# Patient Record
Sex: MALE | Race: WHITE
[De-identification: names, ages, dates, MRNs, and addresses within clinical notes are randomized per-mention and may not be internally consistent; named-entity substitution may affect disease eponyms.]

---

## 2017-09-12 ENCOUNTER — HOSPITAL ENCOUNTER (OUTPATIENT)
Dept: HOSPITAL 8 - OUT | Age: 67
End: 2017-09-12
Attending: INTERNAL MEDICINE
Payer: MEDICARE

## 2017-09-12 VITALS — HEIGHT: 74 IN | WEIGHT: 170.86 LBS | BODY MASS INDEX: 21.93 KG/M2

## 2017-09-12 VITALS — SYSTOLIC BLOOD PRESSURE: 136 MMHG | DIASTOLIC BLOOD PRESSURE: 68 MMHG

## 2017-09-12 DIAGNOSIS — K57.30: ICD-10-CM

## 2017-09-12 DIAGNOSIS — Z88.8: ICD-10-CM

## 2017-09-12 DIAGNOSIS — Z98.890: ICD-10-CM

## 2017-09-12 DIAGNOSIS — Z72.89: ICD-10-CM

## 2017-09-12 DIAGNOSIS — K62.1: Primary | ICD-10-CM

## 2017-09-12 DIAGNOSIS — K64.0: ICD-10-CM

## 2017-09-12 DIAGNOSIS — Z80.8: ICD-10-CM

## 2017-09-12 DIAGNOSIS — Z87.891: ICD-10-CM

## 2017-09-12 PROCEDURE — 45380 COLONOSCOPY AND BIOPSY: CPT

## 2017-09-12 PROCEDURE — 45391 COLONOSCOPY W/ENDOSCOPE US: CPT

## 2017-09-12 PROCEDURE — 88305 TISSUE EXAM BY PATHOLOGIST: CPT

## 2019-01-04 ENCOUNTER — HOSPITAL ENCOUNTER (OUTPATIENT)
Dept: RADIOLOGY | Facility: MEDICAL CENTER | Age: 69
End: 2019-01-04

## 2019-01-04 ENCOUNTER — HOSPITAL ENCOUNTER (INPATIENT)
Facility: MEDICAL CENTER | Age: 69
LOS: 7 days | DRG: 025 | End: 2019-01-11
Attending: EMERGENCY MEDICINE | Admitting: HOSPITALIST
Payer: MEDICARE

## 2019-01-04 DIAGNOSIS — C34.90 PRIMARY MALIGNANT NEOPLASM OF LUNG METASTATIC TO OTHER SITE, UNSPECIFIED LATERALITY (HCC): ICD-10-CM

## 2019-01-04 DIAGNOSIS — G93.89 BRAIN MASS: ICD-10-CM

## 2019-01-04 DIAGNOSIS — C34.82 MALIGNANT NEOPLASM OF OVERLAPPING SITES OF LEFT LUNG (HCC): ICD-10-CM

## 2019-01-04 DIAGNOSIS — C79.31 BRAIN METASTASES: ICD-10-CM

## 2019-01-04 DIAGNOSIS — R29.898 WEAKNESS OF LEFT LOWER EXTREMITY: ICD-10-CM

## 2019-01-04 PROBLEM — R53.1 LEFT-SIDED WEAKNESS: Status: ACTIVE | Noted: 2019-01-04

## 2019-01-04 PROBLEM — D64.9 ANEMIA: Status: ACTIVE | Noted: 2019-01-04

## 2019-01-04 PROBLEM — D69.6 THROMBOCYTOPENIA (HCC): Status: ACTIVE | Noted: 2019-01-04

## 2019-01-04 LAB
ALBUMIN SERPL BCP-MCNC: 3.8 G/DL (ref 3.2–4.9)
ALBUMIN/GLOB SERPL: 1.7 G/DL
ALP SERPL-CCNC: 44 U/L (ref 30–99)
ALT SERPL-CCNC: 9 U/L (ref 2–50)
ANION GAP SERPL CALC-SCNC: 10 MMOL/L (ref 0–11.9)
AST SERPL-CCNC: 22 U/L (ref 12–45)
BASOPHILS # BLD AUTO: 0.2 % (ref 0–1.8)
BASOPHILS # BLD: 0.01 K/UL (ref 0–0.12)
BILIRUB SERPL-MCNC: 0.4 MG/DL (ref 0.1–1.5)
BUN SERPL-MCNC: 20 MG/DL (ref 8–22)
CALCIUM SERPL-MCNC: 8.9 MG/DL (ref 8.5–10.5)
CHLORIDE SERPL-SCNC: 109 MMOL/L (ref 96–112)
CO2 SERPL-SCNC: 22 MMOL/L (ref 20–33)
CREAT SERPL-MCNC: 0.8 MG/DL (ref 0.5–1.4)
EOSINOPHIL # BLD AUTO: 0 K/UL (ref 0–0.51)
EOSINOPHIL NFR BLD: 0 % (ref 0–6.9)
ERYTHROCYTE [DISTWIDTH] IN BLOOD BY AUTOMATED COUNT: 44.6 FL (ref 35.9–50)
GLOBULIN SER CALC-MCNC: 2.2 G/DL (ref 1.9–3.5)
GLUCOSE SERPL-MCNC: 102 MG/DL (ref 65–99)
HCT VFR BLD AUTO: 38.2 % (ref 42–52)
HGB BLD-MCNC: 12.7 G/DL (ref 14–18)
IMM GRANULOCYTES # BLD AUTO: 0.01 K/UL (ref 0–0.11)
IMM GRANULOCYTES NFR BLD AUTO: 0.2 % (ref 0–0.9)
INR PPP: 1.03 (ref 0.87–1.13)
LYMPHOCYTES # BLD AUTO: 0.64 K/UL (ref 1–4.8)
LYMPHOCYTES NFR BLD: 12.9 % (ref 22–41)
MCH RBC QN AUTO: 33.7 PG (ref 27–33)
MCHC RBC AUTO-ENTMCNC: 33.2 G/DL (ref 33.7–35.3)
MCV RBC AUTO: 101.3 FL (ref 81.4–97.8)
MONOCYTES # BLD AUTO: 0.27 K/UL (ref 0–0.85)
MONOCYTES NFR BLD AUTO: 5.4 % (ref 0–13.4)
NEUTROPHILS # BLD AUTO: 4.04 K/UL (ref 1.82–7.42)
NEUTROPHILS NFR BLD: 81.3 % (ref 44–72)
NRBC # BLD AUTO: 0 K/UL
NRBC BLD-RTO: 0 /100 WBC
PLATELET # BLD AUTO: 161 K/UL (ref 164–446)
PMV BLD AUTO: 8.7 FL (ref 9–12.9)
POTASSIUM SERPL-SCNC: 4.5 MMOL/L (ref 3.6–5.5)
PROT SERPL-MCNC: 6 G/DL (ref 6–8.2)
PROTHROMBIN TIME: 13.6 SEC (ref 12–14.6)
RBC # BLD AUTO: 3.77 M/UL (ref 4.7–6.1)
SODIUM SERPL-SCNC: 141 MMOL/L (ref 135–145)
WBC # BLD AUTO: 5 K/UL (ref 4.8–10.8)

## 2019-01-04 PROCEDURE — A9270 NON-COVERED ITEM OR SERVICE: HCPCS | Performed by: HOSPITALIST

## 2019-01-04 PROCEDURE — 96374 THER/PROPH/DIAG INJ IV PUSH: CPT

## 2019-01-04 PROCEDURE — 99285 EMERGENCY DEPT VISIT HI MDM: CPT

## 2019-01-04 PROCEDURE — 80053 COMPREHEN METABOLIC PANEL: CPT

## 2019-01-04 PROCEDURE — 700102 HCHG RX REV CODE 250 W/ 637 OVERRIDE(OP): Performed by: HOSPITALIST

## 2019-01-04 PROCEDURE — 99223 1ST HOSP IP/OBS HIGH 75: CPT | Mod: AI | Performed by: HOSPITALIST

## 2019-01-04 PROCEDURE — 700111 HCHG RX REV CODE 636 W/ 250 OVERRIDE (IP): Performed by: HOSPITALIST

## 2019-01-04 PROCEDURE — 85610 PROTHROMBIN TIME: CPT

## 2019-01-04 PROCEDURE — 85025 COMPLETE CBC W/AUTO DIFF WBC: CPT

## 2019-01-04 PROCEDURE — 770001 HCHG ROOM/CARE - MED/SURG/GYN PRIV*

## 2019-01-04 RX ORDER — BISACODYL 10 MG
10 SUPPOSITORY, RECTAL RECTAL
Status: DISCONTINUED | OUTPATIENT
Start: 2019-01-04 | End: 2019-01-11 | Stop reason: HOSPADM

## 2019-01-04 RX ORDER — AMOXICILLIN 250 MG
2 CAPSULE ORAL 2 TIMES DAILY
Status: DISCONTINUED | OUTPATIENT
Start: 2019-01-05 | End: 2019-01-11 | Stop reason: HOSPADM

## 2019-01-04 RX ORDER — HYDROMORPHONE HYDROCHLORIDE 1 MG/ML
0.5 INJECTION, SOLUTION INTRAMUSCULAR; INTRAVENOUS; SUBCUTANEOUS
Status: DISCONTINUED | OUTPATIENT
Start: 2019-01-04 | End: 2019-01-11 | Stop reason: HOSPADM

## 2019-01-04 RX ORDER — OXYCODONE HYDROCHLORIDE 5 MG/1
5 TABLET ORAL
Status: DISCONTINUED | OUTPATIENT
Start: 2019-01-04 | End: 2019-01-08 | Stop reason: ALTCHOICE

## 2019-01-04 RX ORDER — LEVOTHYROXINE SODIUM 0.1 MG/1
100 TABLET ORAL DAILY
Status: DISCONTINUED | OUTPATIENT
Start: 2019-01-05 | End: 2019-01-11 | Stop reason: HOSPADM

## 2019-01-04 RX ORDER — ONDANSETRON 4 MG/1
4 TABLET, ORALLY DISINTEGRATING ORAL EVERY 4 HOURS PRN
Status: DISCONTINUED | OUTPATIENT
Start: 2019-01-04 | End: 2019-01-11 | Stop reason: HOSPADM

## 2019-01-04 RX ORDER — SODIUM CHLORIDE 9 MG/ML
INJECTION, SOLUTION INTRAVENOUS CONTINUOUS
Status: DISCONTINUED | OUTPATIENT
Start: 2019-01-05 | End: 2019-01-05

## 2019-01-04 RX ORDER — ONDANSETRON 2 MG/ML
4 INJECTION INTRAMUSCULAR; INTRAVENOUS EVERY 4 HOURS PRN
Status: DISCONTINUED | OUTPATIENT
Start: 2019-01-04 | End: 2019-01-11 | Stop reason: HOSPADM

## 2019-01-04 RX ORDER — OXYCODONE HYDROCHLORIDE AND ACETAMINOPHEN 5; 325 MG/1; MG/1
1 TABLET ORAL EVERY 4 HOURS PRN
Status: ON HOLD | COMMUNITY
End: 2019-01-11

## 2019-01-04 RX ORDER — LEVOTHYROXINE SODIUM 100 UG/1
100 CAPSULE ORAL
Status: ON HOLD | COMMUNITY
End: 2019-01-05

## 2019-01-04 RX ORDER — ACETAMINOPHEN 325 MG/1
650 TABLET ORAL ONCE
Status: COMPLETED | OUTPATIENT
Start: 2019-01-05 | End: 2019-01-04

## 2019-01-04 RX ORDER — OXYCODONE HYDROCHLORIDE 10 MG/1
10 TABLET ORAL
Status: DISCONTINUED | OUTPATIENT
Start: 2019-01-04 | End: 2019-01-11 | Stop reason: HOSPADM

## 2019-01-04 RX ORDER — PREDNISONE 10 MG/1
10 TABLET ORAL DAILY
COMMUNITY
End: 2021-01-19

## 2019-01-04 RX ORDER — DEXAMETHASONE SODIUM PHOSPHATE 4 MG/ML
10 INJECTION, SOLUTION INTRA-ARTICULAR; INTRALESIONAL; INTRAMUSCULAR; INTRAVENOUS; SOFT TISSUE EVERY 6 HOURS
Status: DISCONTINUED | OUTPATIENT
Start: 2019-01-05 | End: 2019-01-10

## 2019-01-04 RX ORDER — LORAZEPAM 1 MG/1
TABLET ORAL
Status: ON HOLD | COMMUNITY
Start: 2018-02-26 | End: 2019-01-05

## 2019-01-04 RX ORDER — POLYETHYLENE GLYCOL 3350 17 G/17G
1 POWDER, FOR SOLUTION ORAL
Status: DISCONTINUED | OUTPATIENT
Start: 2019-01-04 | End: 2019-01-11 | Stop reason: HOSPADM

## 2019-01-04 RX ORDER — LANOLIN ALCOHOL/MO/W.PET/CERES
1000 CREAM (GRAM) TOPICAL DAILY
COMMUNITY
End: 2023-09-12

## 2019-01-04 RX ORDER — DEXAMETHASONE SODIUM PHOSPHATE 4 MG/ML
10 INJECTION, SOLUTION INTRA-ARTICULAR; INTRALESIONAL; INTRAMUSCULAR; INTRAVENOUS; SOFT TISSUE EVERY 6 HOURS
Status: DISCONTINUED | OUTPATIENT
Start: 2019-01-04 | End: 2019-01-04

## 2019-01-04 RX ADMIN — DEXAMETHASONE SODIUM PHOSPHATE 10 MG: 4 INJECTION, SOLUTION INTRA-ARTICULAR; INTRALESIONAL; INTRAMUSCULAR; INTRAVENOUS; SOFT TISSUE at 23:52

## 2019-01-04 RX ADMIN — ACETAMINOPHEN 650 MG: 325 TABLET, FILM COATED ORAL at 23:50

## 2019-01-04 ASSESSMENT — LIFESTYLE VARIABLES
TOTAL SCORE: 0
TOTAL SCORE: 0
HAVE PEOPLE ANNOYED YOU BY CRITICIZING YOUR DRINKING: NO
ON A TYPICAL DAY WHEN YOU DRINK ALCOHOL HOW MANY DRINKS DO YOU HAVE: 2
HOW MANY TIMES IN THE PAST YEAR HAVE YOU HAD 5 OR MORE DRINKS IN A DAY: 0
DO YOU DRINK ALCOHOL: YES
AVERAGE NUMBER OF DAYS PER WEEK YOU HAVE A DRINK CONTAINING ALCOHOL: 1
CONSUMPTION TOTAL: NEGATIVE
EVER FELT BAD OR GUILTY ABOUT YOUR DRINKING: NO
TOTAL SCORE: 0
HAVE YOU EVER FELT YOU SHOULD CUT DOWN ON YOUR DRINKING: NO
EVER HAD A DRINK FIRST THING IN THE MORNING TO STEADY YOUR NERVES TO GET RID OF A HANGOVER: NO

## 2019-01-04 ASSESSMENT — PAIN SCALES - GENERAL: PAINLEVEL_OUTOF10: 2

## 2019-01-05 PROBLEM — E03.9 HYPOTHYROIDISM: Status: ACTIVE | Noted: 2019-01-05

## 2019-01-05 LAB — GLUCOSE BLD-MCNC: 138 MG/DL (ref 65–99)

## 2019-01-05 PROCEDURE — 770001 HCHG ROOM/CARE - MED/SURG/GYN PRIV*

## 2019-01-05 PROCEDURE — 90670 PCV13 VACCINE IM: CPT | Performed by: HOSPITALIST

## 2019-01-05 PROCEDURE — 90471 IMMUNIZATION ADMIN: CPT

## 2019-01-05 PROCEDURE — 99232 SBSQ HOSP IP/OBS MODERATE 35: CPT | Performed by: INTERNAL MEDICINE

## 2019-01-05 PROCEDURE — 3E0234Z INTRODUCTION OF SERUM, TOXOID AND VACCINE INTO MUSCLE, PERCUTANEOUS APPROACH: ICD-10-PCS | Performed by: HOSPITALIST

## 2019-01-05 PROCEDURE — 90662 IIV NO PRSV INCREASED AG IM: CPT | Performed by: HOSPITALIST

## 2019-01-05 PROCEDURE — 82962 GLUCOSE BLOOD TEST: CPT

## 2019-01-05 PROCEDURE — 700105 HCHG RX REV CODE 258: Performed by: HOSPITALIST

## 2019-01-05 PROCEDURE — 700102 HCHG RX REV CODE 250 W/ 637 OVERRIDE(OP): Performed by: HOSPITALIST

## 2019-01-05 PROCEDURE — A9270 NON-COVERED ITEM OR SERVICE: HCPCS | Performed by: HOSPITALIST

## 2019-01-05 PROCEDURE — 3E02340 INTRODUCTION OF INFLUENZA VACCINE INTO MUSCLE, PERCUTANEOUS APPROACH: ICD-10-PCS | Performed by: HOSPITALIST

## 2019-01-05 PROCEDURE — 700111 HCHG RX REV CODE 636 W/ 250 OVERRIDE (IP): Performed by: HOSPITALIST

## 2019-01-05 RX ORDER — LEVOTHYROXINE SODIUM 0.1 MG/1
100 TABLET ORAL DAILY
COMMUNITY
End: 2021-01-19

## 2019-01-05 RX ADMIN — PNEUMOCOCCAL 13-VALENT CONJUGATE VACCINE 0.5 ML: 2.2; 2.2; 2.2; 2.2; 2.2; 4.4; 2.2; 2.2; 2.2; 2.2; 2.2; 2.2; 2.2 INJECTION, SUSPENSION INTRAMUSCULAR at 04:59

## 2019-01-05 RX ADMIN — HYDROMORPHONE HYDROCHLORIDE 0.5 MG: 1 INJECTION, SOLUTION INTRAMUSCULAR; INTRAVENOUS; SUBCUTANEOUS at 02:49

## 2019-01-05 RX ADMIN — DEXAMETHASONE SODIUM PHOSPHATE 10 MG: 4 INJECTION, SOLUTION INTRA-ARTICULAR; INTRALESIONAL; INTRAMUSCULAR; INTRAVENOUS; SOFT TISSUE at 23:53

## 2019-01-05 RX ADMIN — OXYCODONE HYDROCHLORIDE 5 MG: 5 TABLET ORAL at 12:51

## 2019-01-05 RX ADMIN — DEXAMETHASONE SODIUM PHOSPHATE 10 MG: 4 INJECTION, SOLUTION INTRA-ARTICULAR; INTRALESIONAL; INTRAMUSCULAR; INTRAVENOUS; SOFT TISSUE at 05:01

## 2019-01-05 RX ADMIN — HYDROMORPHONE HYDROCHLORIDE 0.5 MG: 1 INJECTION, SOLUTION INTRAMUSCULAR; INTRAVENOUS; SUBCUTANEOUS at 06:06

## 2019-01-05 RX ADMIN — OXYCODONE HYDROCHLORIDE 10 MG: 10 TABLET ORAL at 21:57

## 2019-01-05 RX ADMIN — DEXAMETHASONE SODIUM PHOSPHATE 10 MG: 4 INJECTION, SOLUTION INTRA-ARTICULAR; INTRALESIONAL; INTRAMUSCULAR; INTRAVENOUS; SOFT TISSUE at 17:23

## 2019-01-05 RX ADMIN — INFLUENZA A VIRUS A/MICHIGAN/45/2015 X-275 (H1N1) ANTIGEN (FORMALDEHYDE INACTIVATED), INFLUENZA A VIRUS A/SINGAPORE/INFIMH-16-0019/2016 IVR-186 (H3N2) ANTIGEN (FORMALDEHYDE INACTIVATED), AND INFLUENZA B VIRUS B/MARYLAND/15/2016 BX-69A (A B/COLORADO/6/2017-LIKE VIRUS) ANTIGEN (FORMALDEHYDE INACTIVATED) 0.5 ML: 60; 60; 60 INJECTION, SUSPENSION INTRAMUSCULAR at 04:57

## 2019-01-05 RX ADMIN — DEXAMETHASONE SODIUM PHOSPHATE 10 MG: 4 INJECTION, SOLUTION INTRA-ARTICULAR; INTRALESIONAL; INTRAMUSCULAR; INTRAVENOUS; SOFT TISSUE at 12:51

## 2019-01-05 RX ADMIN — SODIUM CHLORIDE: 9 INJECTION, SOLUTION INTRAVENOUS at 02:33

## 2019-01-05 RX ADMIN — OXYCODONE HYDROCHLORIDE 5 MG: 5 TABLET ORAL at 16:52

## 2019-01-05 ASSESSMENT — COGNITIVE AND FUNCTIONAL STATUS - GENERAL
WALKING IN HOSPITAL ROOM: A LITTLE
TURNING FROM BACK TO SIDE WHILE IN FLAT BAD: A LITTLE
TOILETING: A LITTLE
PERSONAL GROOMING: A LITTLE
DRESSING REGULAR UPPER BODY CLOTHING: A LITTLE
DAILY ACTIVITIY SCORE: 18
MOVING FROM LYING ON BACK TO SITTING ON SIDE OF FLAT BED: A LITTLE
MOVING TO AND FROM BED TO CHAIR: A LITTLE
SUGGESTED CMS G CODE MODIFIER DAILY ACTIVITY: CK
EATING MEALS: A LITTLE
HELP NEEDED FOR BATHING: A LITTLE
DRESSING REGULAR LOWER BODY CLOTHING: A LITTLE
CLIMB 3 TO 5 STEPS WITH RAILING: A LITTLE
SUGGESTED CMS G CODE MODIFIER MOBILITY: CK
MOBILITY SCORE: 18
STANDING UP FROM CHAIR USING ARMS: A LITTLE

## 2019-01-05 ASSESSMENT — ENCOUNTER SYMPTOMS
NAUSEA: 0
HEMOPTYSIS: 0
BLURRED VISION: 0
TINGLING: 0
FOCAL WEAKNESS: 1
VOMITING: 0
DIZZINESS: 0
DIARRHEA: 0
SHORTNESS OF BREATH: 0
EYE DISCHARGE: 0
COUGH: 0
PALPITATIONS: 0
CONSTIPATION: 0
WEAKNESS: 0
SPEECH CHANGE: 0
SENSORY CHANGE: 0
HALLUCINATIONS: 0
HEARTBURN: 0
HEADACHES: 0
ABDOMINAL PAIN: 0
CHILLS: 0
WEAKNESS: 1
FLANK PAIN: 0
SHORTNESS OF BREATH: 1
TREMORS: 0
MYALGIAS: 0
DOUBLE VISION: 0
BRUISES/BLEEDS EASILY: 0
DEPRESSION: 0
FEVER: 0
SENSORY CHANGE: 1

## 2019-01-05 ASSESSMENT — PAIN SCALES - GENERAL
PAINLEVEL_OUTOF10: 5
PAINLEVEL_OUTOF10: 3
PAINLEVEL_OUTOF10: 4
PAINLEVEL_OUTOF10: 2
PAINLEVEL_OUTOF10: 2
PAINLEVEL_OUTOF10: 4
PAINLEVEL_OUTOF10: 7
PAINLEVEL_OUTOF10: 2
PAINLEVEL_OUTOF10: 2

## 2019-01-05 ASSESSMENT — PATIENT HEALTH QUESTIONNAIRE - PHQ9
2. FEELING DOWN, DEPRESSED, IRRITABLE, OR HOPELESS: NOT AT ALL
1. LITTLE INTEREST OR PLEASURE IN DOING THINGS: NOT AT ALL
SUM OF ALL RESPONSES TO PHQ9 QUESTIONS 1 AND 2: 0

## 2019-01-05 ASSESSMENT — LIFESTYLE VARIABLES
SUBSTANCE_ABUSE: 0
EVER_SMOKED: YES

## 2019-01-05 NOTE — PROGRESS NOTES
Report received from Home in ER.  Assessment complete.  A&O x 4. Patient calls appropriately.  Patient in bed with standby assist. Bed alarm n/a.   Patient has 6/10 pain. Medicated per MAR.  Denies N&V. Tolerating NPO strict diet.  + void, + flatus, + BM.  Patient denies SOB.  SCD's off.  Patient has headache still and numbness on the L side.   Review plan with of care with patient. Call light and personal belongings with in reach. Hourly rounding in place. All needs met at this time.

## 2019-01-05 NOTE — ASSESSMENT & PLAN NOTE
Continue with neuro checks   This is due to brain metastasis and vasogenic edema  No significant improvement with decadron.

## 2019-01-05 NOTE — ED NOTES
Patient complaining of persistent headache. Dr. Will notified and tylenol PO ordered. Patient able to swallow well without coughing and manages his secretions well. NAD. VSS.

## 2019-01-05 NOTE — PROGRESS NOTES
Neurosurgery    Patient seen and examined.  Consult done.  Has a right frontal met.   I think that he would be an appropriate surgical candidate for resection and post op radiation.

## 2019-01-05 NOTE — ED PROVIDER NOTES
ED Provider Note    HPI: Patient is a 68-year-old male who presented to the emergency department by ambulance transfer January 4, 2019 at 10:03 PM with a chief complaint of a headache.    Patient is had intermittent headaches over the last couple of weeks.  He apparently has had some trouble with left upper and left lower extremity weakness more in the leg.  He also has had intermittent slurred speech.  He has had no nausea or vomiting.  No fever no chills no cough.  No chest pain no shortness of breath no abdominal pain.  No head trauma.  No visual complaints.  No other somatic complaints    Review of Systems: As for intermittent headaches left upper and left lower extremity weakness primarily in the leg intermittent slurred speech negative for nausea vomiting fever chills cough chest pain shortness of breath abdominal pain head trauma visual complaints.  Review of systems reviewed with patient, all other systems    Past medical/surgical history: Stage III lung cancer status post radiation and chemotherapy chronic back pain pneumonia psoriasis    Medications: Percocet levothyroxine Ativan prednisone    Allergies: Demerol    Social History: Previous history of smoking social use of alcohol      Physical exam: Constitutional: Pleasant male awake alert  Vital signs: Temperature 98.1 blood pressure 133/72 pulse 62 respirations 16 pulse oximetry 97  EYES: PERRL, EOMI, Conjunctivae and sclera normal, eyelids normal bilaterally.  Neck: Trachea midline. No cervical masses seen or palpated. Normal range of motion, supple. No meningeal signs elicited.  Cardiac: Regular rate and rhythm. S1-S2 present. No S3 or S4 present. No murmurs, rubs, or gallops heard. No edema or varicosities were seen.   Lungs: Clear to auscultation with good aeration throughout. No wheezes, rales, or rhonchi heard. Patient's chest wall moved symmetrically with each respiratory effort. Patient was not making use of accessory muscles of respiration in  breathing.  Abdomen: Soft nontender to palpation. No rebound or guarding elicited. No organomegaly identified. No pulsatile abdominal masses identified.   Musculoskeletal:  no  pain with palpitation or movement of muscle, bone or joint , no obvious musculoskeletal deformities identified.  Neurologic: alert and awake answers questions appropriately. Moves all four extremities independently, no gross focal abnormalities identified.  Patient appears to have slightly diminished strength in his left upper and left lower extremity.  His speech is fluent but occasionally he has a little bit of difficulty with word finding  Skin: no rash or lesion seen, no palpable dermatologic lesions identified.  Psychiatric: not anxious, delusional, or hallucinating.    Medical decision making: I reviewed the studies from the other facility.  MRI imaging appears to show a right superior mid frontal lobe enhancing mass concerning for metastatic disease with associated vasogenic edema and possible right to left subfalcine herniation    Laboratory studies obtained (please see lab sheet for all results) significant findings included unremarkable CBC and coagulation studies.    Dr. No consulted for neurosurgery.  He reviewed the studies and did not feel any emergent intervention indicated this evening.  At his request the patient be admitted by the hospitalist service and Decadron will be continued at 10 mg IV every 6 hours.  Patient appears to be stable at this time.  Further care and hospital course per attending physician summary    Impression brain mass likely metastatic lung cancer  2.)  Left lower extremity weakness

## 2019-01-05 NOTE — CONSULTS
DATE OF SERVICE:  01/05/2019    CURRENT PRIMARY CARE PROVIDER:  Dr. Yoon.    REFERRING PHYSICIAN:  Amrik Will MD    CHIEF COMPLAINT:  Left-sided weakness.    HISTORY OF PRESENT ILLNESS:  A 68-year-old gentleman who was diagnosed earlier   this year with lung cancer.  He had radiation and chemotherapy for this at   [facility].  He just finished up immunotherapy.  Over the last 2-1/2 weeks, he   has had left-sided weakness, which has been somewhat progressive.  His wife   decided to bring him in after this got to the point where he was at risk of   falling.  MRI examination of the brain showed new intraaxial enhancing mass at   the gray-white junction anterior superior mid frontal lobe measuring   approximately 2.5x1.5x2 cm.  This is consistent with metastatic disease.  He   has significant amount of vasogenic edema with midline shift.    PAST MEDICAL HISTORY:  1.  Lung cancer.  2.  Hypothyroidism.    PAST SURGICAL HISTORY:  He has a surgical history of lung biopsy.  He has had   some orthopedic procedures.    REVIEW OF SYSTEMS:  He has had no chest pain or chest pressure.  He has had   some shortness of breath.  He has had no recent cough or hemoptysis.    ALLERGIES:  DEMEROL.    MEDICATIONS:  Include vitamin B12, Ativan, levothyroxine, oxycodone, and   prednisone, which has recently been administered.    PHYSICAL EXAMINATION:  NEUROLOGIC:  Alert and oriented x4.  Cranial nerves II-XII are intact.  Speech   is fluent.  He has left upper extremity weakness with shift at 4/5 affecting   the hand greater than the arm and left lower extremity strength of 4+/5.    Sensory examination appears intact.    IMPRESSION:  Right frontal lesion, which is very superficial.  This is causing   a significant amount of edema and is consistent with a lung metastasis.  I   think the best way to treat this would be a cranial resection.  Certainly one   could consider radiation alone, but I think he would do best if this was    resected followed by radiation therapy.    He appears to be doing well from his lung cancer treatment.  Dr. Pérez is his   oncologist.    I have discussed this with him as far as my suggestion and he would like to   proceed with surgical intervention.  I will meet with his wife and his sister   later on this morning.       ____________________________________     MD PJ CORTEZ / ATTILA    DD:  01/05/2019 08:08:31  DT:  01/05/2019 09:08:17    D#:  [id number]  Job#:  [id number]    cc: NATALIE Fischer MD

## 2019-01-05 NOTE — PROGRESS NOTES
2 RN Skin check for admission:    Dry/flaky BLE and BUE.  Generalized psoriasis.    All other bony prominences are intact.

## 2019-01-05 NOTE — H&P
Hospital Medicine History & Physical Note    Date of Service  1/4/2019    Primary Care Physician  Karrie Gonzalez M.D.    Consultants  NSG    Code Status  DNR/DNI    Chief Complaint  Left sided weakness    History of Presenting Illness  68 y.o. male who presented 1/4/2019 with left-sided weakness.  This patient was getting outpatient imaging today of his brain and his neck and the radiologist called the patient to go to the emergency room.  There is a new intra-axial enhancing mass in the anterior superior mid frontal lobe on the right consistent with metastatic disease with associated vasogenic edema as well as some right to left herniation.  This patient has had symptoms for about 3 weeks he has had left upper and lower extremity weakness feels like his leg is dragging behind him.  He is also had some slight slurred speech.  He has a history of lung cancer but was told that he is in remission and is on oral medication right now with Dr. Pérez from oncology.  He otherwise has no known alleviating or exacerbating factors to his symptoms.  Upon review of outside hospital records patient had a chest x-ray no new consolidation to suggest infection linear areas of consolidation left lung do not appear significantly changed consistent with treatment related changes.  MRI of the cervical spine shows redemonstration of reversal of the normal cervical lordosis associated with stable to degenerative disc disease collapse and endplate bone spurring and facet arthrosis at C5-C6 and C6-C7 resulting in mild canal narrowing without cord impingement at C5 through C6 and osseous foraminal narrowing moderate to severe left to right at C5-C6 moderate to severe right and mild to moderate left C6 through C7 unchanged.  MRI of the brain is interpreted by radiology shows new intra-axial enhancing mass at the gray white junction within the anterior superior mid frontal lobe of the right measuring 2.5 x 1.5 x 2 cm consistent with  metastatic disease associated with extensive vasogenic edema effacing the underlying right lateral ventricle resulting in 9-10 mm of right to left sub-alkaline herniation      Review of Systems  Review of Systems   Constitutional: Negative for chills and fever.   HENT: Negative for congestion, hearing loss and tinnitus.    Eyes: Negative for blurred vision, double vision and discharge.   Respiratory: Positive for shortness of breath. Negative for cough and hemoptysis.    Cardiovascular: Negative for chest pain, palpitations and leg swelling.   Gastrointestinal: Negative for abdominal pain, heartburn, nausea and vomiting.   Genitourinary: Negative for dysuria and flank pain.   Musculoskeletal: Negative for joint pain and myalgias.   Skin: Negative for rash.   Neurological: Positive for sensory change and focal weakness. Negative for dizziness, speech change and weakness.   Endo/Heme/Allergies: Negative for environmental allergies. Does not bruise/bleed easily.   Psychiatric/Behavioral: Negative for depression, hallucinations and substance abuse.       Past Medical History   has a past medical history of Cancer (HCC) and Hypothyroid.    Surgical History   has a past surgical history that includes other orthopedic surgery and lung needle biopsy.     Family History  Reviewed and noncontributory    Social History   reports that he quit smoking about 5 years ago. He has quit using smokeless tobacco. He reports that he drinks alcohol. He reports that he does not use drugs.    Allergies  Allergies   Allergen Reactions   • Demerol        Medications  Prior to Admission Medications   Prescriptions Last Dose Informant Patient Reported? Taking?   Cyanocobalamin (VITAMIN B-12) 1000 MCG Tab   Yes No   Sig: Take 1,000 mcg by mouth.   LORazepam (ATIVAN) 1 MG Tab   Yes Yes   Levothyroxine Sodium 100 MCG Cap   Yes No   Sig: Take 100 mcg by mouth.   MAGNESIUM PO   Yes No   Sig: Take  by mouth.   oxyCODONE-acetaminophen (PERCOCET)  5-325 MG Tab   Yes Yes   Sig: Take 1-2 Tabs by mouth every four hours as needed.   predniSONE (DELTASONE) 10 MG Tab   Yes No   Sig: Take 10 mg by mouth.      Facility-Administered Medications: None       Physical Exam  Temp:  [36.7 °C (98.1 °F)] 36.7 °C (98.1 °F)  Pulse:  [62-63] 63  Resp:  [16-17] 17  BP: (133)/(72) 133/72    Physical Exam   Constitutional: He appears well-developed and well-nourished.   HENT:   Head: Normocephalic and atraumatic.   Eyes:   Unequal pupil   Neck: Normal range of motion. Neck supple. No JVD present.   Cardiovascular: Normal rate, regular rhythm, normal heart sounds and intact distal pulses.    No murmur heard.  Pulmonary/Chest: Effort normal and breath sounds normal. No respiratory distress. He exhibits no tenderness.   Abdominal: Soft. Bowel sounds are normal. He exhibits no distension. There is no tenderness.   Musculoskeletal: Normal range of motion. He exhibits no edema.   Neurological: He exhibits normal muscle tone.   Left sided weakness, RUE/LUE 4/5   Skin: Skin is warm and dry. No erythema.   Psychiatric:   depressed   Nursing note and vitals reviewed.      Laboratory:  Recent Labs      01/04/19   2210   WBC  5.0   RBC  3.77*   HEMOGLOBIN  12.7*   HEMATOCRIT  38.2*   MCV  101.3*   MCH  33.7*   MCHC  33.2*   RDW  44.6   PLATELETCT  161*   MPV  8.7*         No results for input(s): ALTSGPT, ASTSGOT, ALKPHOSPHAT, TBILIRUBIN, DBILIRUBIN, GAMMAGT, AMYLASE, LIPASE, ALB, PREALBUMIN, GLUCOSE in the last 72 hours.  Recent Labs      01/04/19   2210   INR  1.03             No results for input(s): TROPONINI in the last 72 hours.    Urinalysis:    No results found     Imaging:  OUTSIDE IMAGES-MR BRAIN   Final Result      OUTSIDE IMAGES-MR BRAIN   Final Result      OUTSIDE IMAGES-MR CERVICAL SPINE   Final Result      OUTSIDE IMAGES-DX CHEST   Final Result        Upon review of outside hospital records patient had a chest x-ray no new consolidation to suggest infection linear areas of  consolidation left lung do not appear significantly changed consistent with treatment related changes.  MRI of the cervical spine shows redemonstration of reversal of the normal cervical lordosis associated with stable to degenerative disc disease collapse and endplate bone spurring and facet arthrosis at C5-C6 and C6-C7 resulting in mild canal narrowing without cord impingement at C5 through C6 and osseous foraminal narrowing moderate to severe left to right at C5-C6 moderate to severe right and mild to moderate left C6 through C7 unchanged.  MRI of the brain is interpreted by radiology shows new intra-axial enhancing mass at the gray white junction within the anterior superior mid frontal lobe of the right measuring 2.5 x 1.5 x 2 cm consistent with metastatic disease associated with extensive vasogenic edema effacing the underlying right lateral ventricle resulting in 9-10 mm of right to left sub-alkaline herniation    Assessment/Plan:  I anticipate this patient will require at least two midnights for appropriate medical management, necessitating inpatient admission.    * Brain metastases (HCC)   Assessment & Plan    Known lung cancer on chemo with Dr. Pérez, consult in am   Now with suspect metastatic disease to Brain, evidence of vasogenic edema and shift  Continue with IV decadron   Dysphagia screen  Neuro checks   NPO after midngith  NSG consulted     Hypothyroidism   Assessment & Plan    Continue with levothyroxine   Recheck tsh     Left-sided weakness   Assessment & Plan    Continue with neuro checks   This is due to brain metastasis and vasogenic edema  Assess resposne to IV decadron     Anemia   Assessment & Plan    Mild, no evidence of bleeding  Cont to monitor      Thrombocytopenia (HCC)   Assessment & Plan    Mild, cont to monitor      Lung cancer (HCC)   Assessment & Plan    Currently on chemotherapy   Follows with Dr. Pérez          VTE prophylaxis: scd

## 2019-01-05 NOTE — PROGRESS NOTES
Med rec complete per pt at bedside with family   Allergies reviewed  No oral ABX within last 30 days

## 2019-01-05 NOTE — ASSESSMENT & PLAN NOTE
Currently on chemotherapy   Follows with Dr. Pérez as outpatient.  Patient and family states that they would not accept any further chemotherapy

## 2019-01-05 NOTE — ASSESSMENT & PLAN NOTE
Known lung cancer on chemo with Dr. Pérez  With metastatic disease to Brain, evidence of vasogenic edema and shift  S/p resection; path results positive for metastatic lung adenocarcinoma  Continue with IV decadron; defer tapering to NSg recs   Dysphagia screen  Neuro checks   Dr. Pérez aware; possibly brain mets amenable to radiation?; defer to Onc mgmt

## 2019-01-05 NOTE — CARE PLAN
Problem: Communication  Goal: The ability to communicate needs accurately and effectively will improve  Outcome: PROGRESSING AS EXPECTED  Patient communicating effectively with staff about pain, questions, and other concerns with treatment. All questions answered.     Problem: Safety  Goal: Will remain free from falls  Outcome: PROGRESSING AS EXPECTED  Patient's call light within reach, bed in lowest and locked position, threaded socks on, educated on waiting for assistance.

## 2019-01-05 NOTE — ED TRIAGE NOTES
"Chief Complaint   Patient presents with   • Headache     Patient transfered from [facility]. Patient initially went to the ER for a headache x2 days with left sided weakness x2 weeks. CT scan revealed new brain tumor. Patient A&O x4 in no apparent distress on arrival.        Patient arrives A&O x4 with no complaints.     Blood pressure 133/72, pulse 62, temperature 36.7 °C (98.1 °F), temperature source Temporal, resp. rate 16, height 1.88 m (6' 2\"), weight 80.3 kg (177 lb), SpO2 97 %.    "

## 2019-01-05 NOTE — THERAPY
consult received; mets to brain; noted now awaiting surgical resection (likely 1/7) and post op radiation; will hold until post surgical intervention as medically appropriate; noted pt is up SBA per nsg notes

## 2019-01-05 NOTE — PROGRESS NOTES
[facility] Medicine Daily Progress Note    Date of Service  1/5/2019    Chief Complaint  68 y.o. Male with a h/o lung CA admitted 1/4/2019 with left-sided weakness.    Hospital Course    Found to have brain mass on MRI.  Suspect metastasis.  Anticipating resection on 1/7.       Interval Problem Update  1/5:  Denies pain, shortness of breath, nausea, or vomiting.  VSS.  Persistent left-sided weakness.  Denies numbness or tingling.  No visual changes.    Consultants/Specialty  NSG    Code Status  DNR    Disposition  TBD.    Review of Systems  Review of Systems   Constitutional: Negative for chills and fever.   HENT: Negative for congestion.    Eyes: Negative for blurred vision and double vision.   Respiratory: Negative for cough and shortness of breath.    Cardiovascular: Negative for chest pain, palpitations and leg swelling.   Gastrointestinal: Negative for abdominal pain, constipation, diarrhea, nausea and vomiting.   Genitourinary: Negative for dysuria.   Musculoskeletal: Negative for joint pain and myalgias.   Skin: Negative for itching and rash.   Neurological: Positive for focal weakness and weakness. Negative for dizziness, tingling, tremors, sensory change, speech change and headaches.        Physical Exam  Temp:  [36.7 °C (98 °F)-36.8 °C (98.3 °F)] 36.8 °C (98.3 °F)  Pulse:  [59-63] 61  Resp:  [16-18] 16  BP: (132-138)/(72-78) 132/74    Physical Exam   Constitutional: He is oriented to person, place, and time. He appears well-developed and well-nourished. No distress.   HENT:   Head: Normocephalic and atraumatic.   Mouth/Throat: No oropharyngeal exudate.   Eyes: Conjunctivae are normal. Right eye exhibits no discharge. Left eye exhibits no discharge.   Neck: Normal range of motion. No tracheal deviation present.   Cardiovascular: Normal rate, normal heart sounds and intact distal pulses.    No murmur heard.  Pulmonary/Chest: Effort normal and breath sounds normal. No stridor. No respiratory distress. He has no  wheezes.   Abdominal: Soft. Bowel sounds are normal. He exhibits no distension. There is no tenderness. There is no rebound.   Musculoskeletal: Normal range of motion. He exhibits no edema.   Neurological: He is alert and oriented to person, place, and time.   LUE strength 4/5  LLE strength 5/5   Skin: Skin is warm and dry. No rash noted. He is not diaphoretic. No erythema.   Psychiatric: He has a normal mood and affect.   Nursing note and vitals reviewed.      Fluids    Intake/Output Summary (Last 24 hours) at 01/05/19 1404  Last data filed at 01/05/19 0400   Gross per 24 hour   Intake              145 ml   Output                0 ml   Net              145 ml       Laboratory  Recent Labs      01/04/19   2210   WBC  5.0   RBC  3.77*   HEMOGLOBIN  12.7*   HEMATOCRIT  38.2*   MCV  101.3*   MCH  33.7*   MCHC  33.2*   RDW  44.6   PLATELETCT  161*   MPV  8.7*     Recent Labs      01/04/19   2210   SODIUM  141   POTASSIUM  4.5   CHLORIDE  109   CO2  22   GLUCOSE  102*   BUN  20   CREATININE  0.80   CALCIUM  8.9     Recent Labs      01/04/19   2210   INR  1.03               Imaging  OUTSIDE IMAGES-MR BRAIN   Final Result      OUTSIDE IMAGES-MR BRAIN   Final Result      OUTSIDE IMAGES-MR CERVICAL SPINE   Final Result      OUTSIDE IMAGES-DX CHEST   Final Result           Assessment/Plan  * Brain metastases (HCC)   Assessment & Plan    Known lung cancer on chemo with Dr. Pérez  Now with suspect metastatic disease to Brain, evidence of vasogenic edema and shift  Continue with IV decadron   Dysphagia screen  Neuro checks   NSG following.  Anticipate resection on 1/7.  Likely will need radiation postop.     Hypothyroidism   Assessment & Plan    Continue with levothyroxine   Recheck tsh     Left-sided weakness   Assessment & Plan    Continue with neuro checks   This is due to brain metastasis and vasogenic edema  Assess resposne to IV decadron     Anemia   Assessment & Plan    Mild, no evidence of bleeding  Cont to monitor       Thrombocytopenia (HCC)   Assessment & Plan    Mild, cont to monitor      Lung cancer (HCC)   Assessment & Plan    Currently on chemotherapy   Follows with Dr. Pérez   Will attempt to update Dr. Pérez.          VTE prophylaxis: SCDs

## 2019-01-05 NOTE — ED NOTES
Patient ambulatory with steady gait. Patient normally ambulates with a eren. Patient returned to bed. NAD. VSS.

## 2019-01-06 LAB
ERYTHROCYTE [DISTWIDTH] IN BLOOD BY AUTOMATED COUNT: 43.2 FL (ref 35.9–50)
HCT VFR BLD AUTO: 39.6 % (ref 42–52)
HGB BLD-MCNC: 13.6 G/DL (ref 14–18)
MCH RBC QN AUTO: 33.9 PG (ref 27–33)
MCHC RBC AUTO-ENTMCNC: 34.3 G/DL (ref 33.7–35.3)
MCV RBC AUTO: 98.8 FL (ref 81.4–97.8)
PLATELET # BLD AUTO: 178 K/UL (ref 164–446)
PMV BLD AUTO: 8.7 FL (ref 9–12.9)
RBC # BLD AUTO: 4.01 M/UL (ref 4.7–6.1)
WBC # BLD AUTO: 10.8 K/UL (ref 4.8–10.8)

## 2019-01-06 PROCEDURE — 85027 COMPLETE CBC AUTOMATED: CPT

## 2019-01-06 PROCEDURE — A9270 NON-COVERED ITEM OR SERVICE: HCPCS | Performed by: HOSPITALIST

## 2019-01-06 PROCEDURE — 770001 HCHG ROOM/CARE - MED/SURG/GYN PRIV*

## 2019-01-06 PROCEDURE — 36415 COLL VENOUS BLD VENIPUNCTURE: CPT

## 2019-01-06 PROCEDURE — 99232 SBSQ HOSP IP/OBS MODERATE 35: CPT | Performed by: INTERNAL MEDICINE

## 2019-01-06 PROCEDURE — 700102 HCHG RX REV CODE 250 W/ 637 OVERRIDE(OP): Performed by: HOSPITALIST

## 2019-01-06 PROCEDURE — 700111 HCHG RX REV CODE 636 W/ 250 OVERRIDE (IP): Performed by: HOSPITALIST

## 2019-01-06 PROCEDURE — 700101 HCHG RX REV CODE 250: Performed by: NURSE PRACTITIONER

## 2019-01-06 RX ORDER — SODIUM CHLORIDE AND POTASSIUM CHLORIDE 150; 900 MG/100ML; MG/100ML
INJECTION, SOLUTION INTRAVENOUS CONTINUOUS
Status: DISCONTINUED | OUTPATIENT
Start: 2019-01-07 | End: 2019-01-08

## 2019-01-06 RX ADMIN — OXYCODONE HYDROCHLORIDE 5 MG: 5 TABLET ORAL at 04:30

## 2019-01-06 RX ADMIN — STANDARDIZED SENNA CONCENTRATE AND DOCUSATE SODIUM 2 TABLET: 8.6; 5 TABLET, FILM COATED ORAL at 04:30

## 2019-01-06 RX ADMIN — LEVOTHYROXINE SODIUM 100 MCG: 0.1 TABLET ORAL at 04:30

## 2019-01-06 RX ADMIN — DEXAMETHASONE SODIUM PHOSPHATE 10 MG: 4 INJECTION, SOLUTION INTRA-ARTICULAR; INTRALESIONAL; INTRAMUSCULAR; INTRAVENOUS; SOFT TISSUE at 11:28

## 2019-01-06 RX ADMIN — DEXAMETHASONE SODIUM PHOSPHATE 10 MG: 4 INJECTION, SOLUTION INTRA-ARTICULAR; INTRALESIONAL; INTRAMUSCULAR; INTRAVENOUS; SOFT TISSUE at 17:38

## 2019-01-06 RX ADMIN — DEXAMETHASONE SODIUM PHOSPHATE 10 MG: 4 INJECTION, SOLUTION INTRA-ARTICULAR; INTRALESIONAL; INTRAMUSCULAR; INTRAVENOUS; SOFT TISSUE at 04:30

## 2019-01-06 RX ADMIN — OXYCODONE HYDROCHLORIDE 10 MG: 10 TABLET ORAL at 23:21

## 2019-01-06 RX ADMIN — OXYCODONE HYDROCHLORIDE 10 MG: 10 TABLET ORAL at 09:20

## 2019-01-06 RX ADMIN — DEXAMETHASONE SODIUM PHOSPHATE 10 MG: 4 INJECTION, SOLUTION INTRA-ARTICULAR; INTRALESIONAL; INTRAMUSCULAR; INTRAVENOUS; SOFT TISSUE at 23:21

## 2019-01-06 RX ADMIN — OXYCODONE HYDROCHLORIDE 10 MG: 10 TABLET ORAL at 12:39

## 2019-01-06 RX ADMIN — OXYCODONE HYDROCHLORIDE 10 MG: 10 TABLET ORAL at 16:04

## 2019-01-06 RX ADMIN — STANDARDIZED SENNA CONCENTRATE AND DOCUSATE SODIUM 2 TABLET: 8.6; 5 TABLET, FILM COATED ORAL at 17:38

## 2019-01-06 RX ADMIN — OXYCODONE HYDROCHLORIDE 10 MG: 10 TABLET ORAL at 19:24

## 2019-01-06 RX ADMIN — POLYETHYLENE GLYCOL 3350 1 PACKET: 17 POWDER, FOR SOLUTION ORAL at 23:34

## 2019-01-06 RX ADMIN — POTASSIUM CHLORIDE AND SODIUM CHLORIDE: 900; 150 INJECTION, SOLUTION INTRAVENOUS at 23:22

## 2019-01-06 ASSESSMENT — ENCOUNTER SYMPTOMS
SORE THROAT: 0
VOMITING: 0
HEADACHES: 0
DIARRHEA: 0
NAUSEA: 0
TREMORS: 1
SPEECH CHANGE: 0
WHEEZING: 0
FEVER: 0
TINGLING: 0
WEAKNESS: 1
COUGH: 0
CHILLS: 0
DIZZINESS: 0
FOCAL WEAKNESS: 1
BLURRED VISION: 0
FALLS: 0
SENSORY CHANGE: 0
SHORTNESS OF BREATH: 0
MYALGIAS: 0
ABDOMINAL PAIN: 0

## 2019-01-06 ASSESSMENT — PAIN SCALES - GENERAL
PAINLEVEL_OUTOF10: 5
PAINLEVEL_OUTOF10: 2
PAINLEVEL_OUTOF10: 5
PAINLEVEL_OUTOF10: 7
PAINLEVEL_OUTOF10: 3
PAINLEVEL_OUTOF10: 4
PAINLEVEL_OUTOF10: 5
PAINLEVEL_OUTOF10: 5
PAINLEVEL_OUTOF10: 3
PAINLEVEL_OUTOF10: 5

## 2019-01-06 NOTE — PROGRESS NOTES
Bedside report received.  Assessment complete.  A&O x 4. Patient calls appropriately.  Patient in bed with standby assist. Bed alarm n/a.   Patient has 3/10 pain. Denies pain interventions  Denies N&V. Tolerating regular diet.  + void, + flatus, + BM.  Patient denies SOB.  SCD's refused.  Surgery on Monday for resection of mass.   Headache is persistant, with medication some relief, and left sided weakness, no signs of worsening.  Review plan with of care with patient. Call light and personal belongings with in reach. Hourly rounding in place. All needs met at this time.

## 2019-01-06 NOTE — PROGRESS NOTES
Neurosurgery Progress Note    Subjective:  Patient may be a little weaker today. Has a right frontal metastasis. Has been on immunotherapy with Dr. Pérez for Supposed stage Three Lung adenocarcinoma. I do not have the records to confirm, but discussed with Dr. GILMAR Pérez today. He agrees that the appropriate course is resection probably followed by radiation. Discussed with patient and he is on board with proceeding with surgery. Discussed this with his wife yesterday and sister and they are in agreement.       Exam:  Alert and oriented  Perrl  Lungs are clear.   Heart rrr, without murmur or gallop.   No carotid bruits.     Neuro, has left hemiparesis at 4/5 proximally, but distally had is 2/5 and worse than on admit.   Sensory intact.     BP  Min: 113/69  Max: 135/77  Pulse  Av.8  Min: 62  Max: 78  Resp  Av.2  Min: 16  Max: 17  Temp  Av.9 °C (98.5 °F)  Min: 36.7 °C (98.1 °F)  Max: 37.2 °C (98.9 °F)  SpO2  Av.8 %  Min: 93 %  Max: 96 %    No Data Recorded    Recent Labs      19   2210  19   0404   WBC  5.0  10.8   RBC  3.77*  4.01*   HEMOGLOBIN  12.7*  13.6*   HEMATOCRIT  38.2*  39.6*   MCV  101.3*  98.8*   MCH  33.7*  33.9*   MCHC  33.2*  34.3   RDW  44.6  43.2   PLATELETCT  161*  178   MPV  8.7*  8.7*     Recent Labs      19   2210   SODIUM  141   POTASSIUM  4.5   CHLORIDE  109   CO2  22   GLUCOSE  102*   BUN  20   CREATININE  0.80   CALCIUM  8.9     Recent Labs      19   2210   INR  1.03           Intake/Output       19 - 19 0659 19 - 19 0659       Total  Total       Intake    P.O.  --  300 300  --  -- --    P.O. -- 300 300 -- -- --    Total Intake -- 300 300 -- -- --       Output    Stool  --  -- --  --  -- --    Number of Times Stooled -- 0 x 0 x -- -- --    Total Output -- -- -- -- -- --       Net I/O     -- 300 300 -- -- --            Intake/Output Summary (Last 24 hours) at 19 6171  Last  data filed at 01/06/19 0400   Gross per 24 hour   Intake              300 ml   Output                0 ml   Net              300 ml            • dexamethasone  10 mg Q6HRS   • senna-docusate  2 Tab BID    And   • polyethylene glycol/lytes  1 Packet QDAY PRN    And   • magnesium hydroxide  30 mL QDAY PRN    And   • bisacodyl  10 mg QDAY PRN   • ondansetron  4 mg Q4HRS PRN   • ondansetron  4 mg Q4HRS PRN   • Pharmacy Consult Request   PRN    And   • oxyCODONE immediate-release  5 mg Q3HRS PRN    And   • oxyCODONE immediate-release  10 mg Q3HRS PRN    And   • HYDROmorphone  0.5 mg Q3HRS PRN   • levothyroxine  100 mcg DAILY       Assessment and Plan:  Hospital day #2  POD #-1  Prophylactic anticoagulation: no      Start date/time: or craniotomy tomorrow.     I have discussed the indications and possible complications of surgery with the patient.   I have told him that he may be paralyzed after surgery on the left, talked about siezures, infection, and hemorrhage.  He would like to proceed with stereotactic resection of the brain tumor tomorrow.   Again discussed case with Dr. Pérez    Encounter including medical decision making with discussion with Ebonie Pérez 20 min.     Scheduled for approximately 10 am for mri, and 12 noon for surgery/

## 2019-01-06 NOTE — CARE PLAN
Problem: Knowledge Deficit  Goal: Knowledge of disease process/condition, treatment plan, diagnostic tests, and medications will improve  Outcome: PROGRESSING AS EXPECTED  Pt updated on POC and plan for surgery on Monday-family also updated on POC. Pt verbalizes understanding.

## 2019-01-06 NOTE — PROGRESS NOTES
Assumed care of patient form night shift RN  68 year old male  DNR/DNI  Allergies to demerol, indomethacin, meperidine  Admitted 1/4 d/t HA, left sided weakness  Pain 2/10 will medicate per MAR  A&O x 4  Cardiac: WNL  RA  LBM 1/4  Regular diet  Voiding in urinal/bathroom  Up standby with cane  Low fall risk per ramos reynoso  Skin: generalized psoriasis  PIV: 20 g R AC, SL  Plan: surgery tomorrow to remove brain tumor  All questions answered and all needs met at this time. Bed in low, locked position. Call light within reach. RN will implement hourly rounding and answer call lights appropriately.

## 2019-01-06 NOTE — CARE PLAN
Problem: Safety  Goal: Will remain free from injury  Outcome: PROGRESSING AS EXPECTED  Pt has call light in reach, bed in lowest and locked position, non scid socks on and pt calls for assist with mobility.

## 2019-01-07 ENCOUNTER — APPOINTMENT (OUTPATIENT)
Dept: RADIOLOGY | Facility: MEDICAL CENTER | Age: 69
DRG: 025 | End: 2019-01-07
Attending: NURSE PRACTITIONER
Payer: MEDICARE

## 2019-01-07 LAB
ANION GAP SERPL CALC-SCNC: 6 MMOL/L (ref 0–11.9)
APTT PPP: 25.8 SEC (ref 24.7–36)
BUN SERPL-MCNC: 23 MG/DL (ref 8–22)
CALCIUM SERPL-MCNC: 9.3 MG/DL (ref 8.5–10.5)
CHLORIDE SERPL-SCNC: 108 MMOL/L (ref 96–112)
CO2 SERPL-SCNC: 23 MMOL/L (ref 20–33)
CREAT SERPL-MCNC: 0.83 MG/DL (ref 0.5–1.4)
ERYTHROCYTE [DISTWIDTH] IN BLOOD BY AUTOMATED COUNT: 43.6 FL (ref 35.9–50)
GLUCOSE SERPL-MCNC: 134 MG/DL (ref 65–99)
HCT VFR BLD AUTO: 39.4 % (ref 42–52)
HGB BLD-MCNC: 13.1 G/DL (ref 14–18)
INR PPP: 1.09 (ref 0.87–1.13)
MCH RBC QN AUTO: 33.6 PG (ref 27–33)
MCHC RBC AUTO-ENTMCNC: 33.2 G/DL (ref 33.7–35.3)
MCV RBC AUTO: 101 FL (ref 81.4–97.8)
PATHOLOGY CONSULT NOTE: NORMAL
PLATELET # BLD AUTO: 178 K/UL (ref 164–446)
PMV BLD AUTO: 8.7 FL (ref 9–12.9)
POTASSIUM SERPL-SCNC: 3.8 MMOL/L (ref 3.6–5.5)
PROTHROMBIN TIME: 14.3 SEC (ref 12–14.6)
RBC # BLD AUTO: 3.9 M/UL (ref 4.7–6.1)
SODIUM SERPL-SCNC: 137 MMOL/L (ref 135–145)
WBC # BLD AUTO: 10 K/UL (ref 4.8–10.8)

## 2019-01-07 PROCEDURE — 80048 BASIC METABOLIC PNL TOTAL CA: CPT

## 2019-01-07 PROCEDURE — 85730 THROMBOPLASTIN TIME PARTIAL: CPT

## 2019-01-07 PROCEDURE — A6402 STERILE GAUZE <= 16 SQ IN: HCPCS | Performed by: NEUROLOGICAL SURGERY

## 2019-01-07 PROCEDURE — 700111 HCHG RX REV CODE 636 W/ 250 OVERRIDE (IP): Performed by: HOSPITALIST

## 2019-01-07 PROCEDURE — 500331 HCHG COTTONOID, SURG PATTIE: Performed by: NEUROLOGICAL SURGERY

## 2019-01-07 PROCEDURE — 88307 TISSUE EXAM BY PATHOLOGIST: CPT

## 2019-01-07 PROCEDURE — 85610 PROTHROMBIN TIME: CPT

## 2019-01-07 PROCEDURE — C1713 ANCHOR/SCREW BN/BN,TIS/BN: HCPCS | Performed by: NEUROLOGICAL SURGERY

## 2019-01-07 PROCEDURE — C1725 CATH, TRANSLUMIN NON-LASER: HCPCS | Performed by: NEUROLOGICAL SURGERY

## 2019-01-07 PROCEDURE — 00B00ZZ EXCISION OF BRAIN, OPEN APPROACH: ICD-10-PCS | Performed by: NEUROLOGICAL SURGERY

## 2019-01-07 PROCEDURE — A9270 NON-COVERED ITEM OR SERVICE: HCPCS | Performed by: ANESTHESIOLOGY

## 2019-01-07 PROCEDURE — 500445 HCHG HEMOSTAT, SURGICEL 4X8: Performed by: NEUROLOGICAL SURGERY

## 2019-01-07 PROCEDURE — 99221 1ST HOSP IP/OBS SF/LOW 40: CPT | Performed by: INTERNAL MEDICINE

## 2019-01-07 PROCEDURE — 88313 SPECIAL STAINS GROUP 2: CPT

## 2019-01-07 PROCEDURE — 700111 HCHG RX REV CODE 636 W/ 250 OVERRIDE (IP): Performed by: NURSE PRACTITIONER

## 2019-01-07 PROCEDURE — 500889 HCHG PACK, NEURO: Performed by: NEUROLOGICAL SURGERY

## 2019-01-07 PROCEDURE — 160035 HCHG PACU - 1ST 60 MINS PHASE I: Performed by: NEUROLOGICAL SURGERY

## 2019-01-07 PROCEDURE — A4338 INDWELLING CATHETER LATEX: HCPCS | Performed by: NEUROLOGICAL SURGERY

## 2019-01-07 PROCEDURE — 500367 HCHG DRAIN KIT, HEMOVAC: Performed by: NEUROLOGICAL SURGERY

## 2019-01-07 PROCEDURE — 70450 CT HEAD/BRAIN W/O DYE: CPT

## 2019-01-07 PROCEDURE — 88342 IMHCHEM/IMCYTCHM 1ST ANTB: CPT

## 2019-01-07 PROCEDURE — 70553 MRI BRAIN STEM W/O & W/DYE: CPT

## 2019-01-07 PROCEDURE — 500075 HCHG BLADE, CLIPPER NEURO: Performed by: NEUROLOGICAL SURGERY

## 2019-01-07 PROCEDURE — 160048 HCHG OR STATISTICAL LEVEL 1-5: Performed by: NEUROLOGICAL SURGERY

## 2019-01-07 PROCEDURE — 501838 HCHG SUTURE GENERAL: Performed by: NEUROLOGICAL SURGERY

## 2019-01-07 PROCEDURE — 99232 SBSQ HOSP IP/OBS MODERATE 35: CPT | Performed by: INTERNAL MEDICINE

## 2019-01-07 PROCEDURE — 700101 HCHG RX REV CODE 250

## 2019-01-07 PROCEDURE — 160036 HCHG PACU - EA ADDL 30 MINS PHASE I: Performed by: NEUROLOGICAL SURGERY

## 2019-01-07 PROCEDURE — A9270 NON-COVERED ITEM OR SERVICE: HCPCS | Performed by: HOSPITALIST

## 2019-01-07 PROCEDURE — 770022 HCHG ROOM/CARE - ICU (200)

## 2019-01-07 PROCEDURE — A4314 CATH W/DRAINAGE 2-WAY LATEX: HCPCS | Performed by: NEUROLOGICAL SURGERY

## 2019-01-07 PROCEDURE — 700105 HCHG RX REV CODE 258: Performed by: NURSE PRACTITIONER

## 2019-01-07 PROCEDURE — 700111 HCHG RX REV CODE 636 W/ 250 OVERRIDE (IP): Performed by: ANESTHESIOLOGY

## 2019-01-07 PROCEDURE — 88341 IMHCHEM/IMCYTCHM EA ADD ANTB: CPT | Mod: 91

## 2019-01-07 PROCEDURE — 160002 HCHG RECOVERY MINUTES (STAT): Performed by: NEUROLOGICAL SURGERY

## 2019-01-07 PROCEDURE — 160042 HCHG SURGERY MINUTES - EA ADDL 1 MIN LEVEL 5: Performed by: NEUROLOGICAL SURGERY

## 2019-01-07 PROCEDURE — 700117 HCHG RX CONTRAST REV CODE 255: Performed by: INTERNAL MEDICINE

## 2019-01-07 PROCEDURE — 700102 HCHG RX REV CODE 250 W/ 637 OVERRIDE(OP): Performed by: HOSPITALIST

## 2019-01-07 PROCEDURE — 85027 COMPLETE CBC AUTOMATED: CPT

## 2019-01-07 PROCEDURE — A9270 NON-COVERED ITEM OR SERVICE: HCPCS

## 2019-01-07 PROCEDURE — 700111 HCHG RX REV CODE 636 W/ 250 OVERRIDE (IP)

## 2019-01-07 PROCEDURE — 160031 HCHG SURGERY MINUTES - 1ST 30 MINS LEVEL 5: Performed by: NEUROLOGICAL SURGERY

## 2019-01-07 PROCEDURE — A4340 INDWELLING CATHETER SPECIAL: HCPCS | Performed by: NEUROLOGICAL SURGERY

## 2019-01-07 PROCEDURE — A9585 GADOBUTROL INJECTION: HCPCS | Performed by: INTERNAL MEDICINE

## 2019-01-07 PROCEDURE — 110454 HCHG SHELL REV 250: Performed by: NEUROLOGICAL SURGERY

## 2019-01-07 PROCEDURE — 700102 HCHG RX REV CODE 250 W/ 637 OVERRIDE(OP): Performed by: ANESTHESIOLOGY

## 2019-01-07 PROCEDURE — 500440 HCHG DRESSING, STERILE ROLL (KERLIX): Performed by: NEUROLOGICAL SURGERY

## 2019-01-07 PROCEDURE — 700102 HCHG RX REV CODE 250 W/ 637 OVERRIDE(OP)

## 2019-01-07 PROCEDURE — 700101 HCHG RX REV CODE 250: Performed by: NURSE PRACTITIONER

## 2019-01-07 PROCEDURE — 160009 HCHG ANES TIME/MIN: Performed by: NEUROLOGICAL SURGERY

## 2019-01-07 DEVICE — PLATE NC BURR HOLE COVER FOR SHUNT 14MM (6NCX6=36): Type: IMPLANTABLE DEVICE | Status: FUNCTIONAL

## 2019-01-07 DEVICE — GRAFT DURAMATRIX ONLAY PLUS 3IN X 3IN OR 7.5CM X 7.5CM: Type: IMPLANTABLE DEVICE | Status: FUNCTIONAL

## 2019-01-07 DEVICE — SCREW STRYK NC 1.5X5MM (6NCX40=240) (80EA/PK) CONSIGNED QTY 240 PRE-LOAD: Type: IMPLANTABLE DEVICE | Status: FUNCTIONAL

## 2019-01-07 DEVICE — PLATE NC SKULL BASE TEMPORAL MAL (6NCX1=6): Type: IMPLANTABLE DEVICE | Status: FUNCTIONAL

## 2019-01-07 RX ORDER — HYDROMORPHONE HYDROCHLORIDE 1 MG/ML
0.2 INJECTION, SOLUTION INTRAMUSCULAR; INTRAVENOUS; SUBCUTANEOUS
Status: DISCONTINUED | OUTPATIENT
Start: 2019-01-07 | End: 2019-01-08

## 2019-01-07 RX ORDER — OXYCODONE HCL 5 MG/5 ML
10 SOLUTION, ORAL ORAL
Status: COMPLETED | OUTPATIENT
Start: 2019-01-07 | End: 2019-01-07

## 2019-01-07 RX ORDER — HALOPERIDOL 5 MG/ML
1 INJECTION INTRAMUSCULAR
Status: DISCONTINUED | OUTPATIENT
Start: 2019-01-07 | End: 2019-01-08

## 2019-01-07 RX ORDER — BACITRACIN 50000 [IU]/1
INJECTION, POWDER, FOR SOLUTION INTRAMUSCULAR
Status: DISCONTINUED | OUTPATIENT
Start: 2019-01-07 | End: 2019-01-07 | Stop reason: HOSPADM

## 2019-01-07 RX ORDER — OXYCODONE HCL 5 MG/5 ML
5 SOLUTION, ORAL ORAL
Status: COMPLETED | OUTPATIENT
Start: 2019-01-07 | End: 2019-01-07

## 2019-01-07 RX ORDER — ACETAMINOPHEN 500 MG
1000 TABLET ORAL ONCE
Status: COMPLETED | OUTPATIENT
Start: 2019-01-07 | End: 2019-01-07

## 2019-01-07 RX ORDER — HYDROMORPHONE HYDROCHLORIDE 1 MG/ML
0.4 INJECTION, SOLUTION INTRAMUSCULAR; INTRAVENOUS; SUBCUTANEOUS
Status: DISCONTINUED | OUTPATIENT
Start: 2019-01-07 | End: 2019-01-08

## 2019-01-07 RX ORDER — METOPROLOL TARTRATE 1 MG/ML
1 INJECTION, SOLUTION INTRAVENOUS
Status: DISCONTINUED | OUTPATIENT
Start: 2019-01-07 | End: 2019-01-08

## 2019-01-07 RX ORDER — ONDANSETRON 2 MG/ML
4 INJECTION INTRAMUSCULAR; INTRAVENOUS
Status: COMPLETED | OUTPATIENT
Start: 2019-01-07 | End: 2019-01-07

## 2019-01-07 RX ORDER — ENEMA 19; 7 G/133ML; G/133ML
1 ENEMA RECTAL
Status: DISCONTINUED | OUTPATIENT
Start: 2019-01-07 | End: 2019-01-11 | Stop reason: HOSPADM

## 2019-01-07 RX ORDER — GADOBUTROL 604.72 MG/ML
10 INJECTION INTRAVENOUS ONCE
Status: COMPLETED | OUTPATIENT
Start: 2019-01-07 | End: 2019-01-07

## 2019-01-07 RX ORDER — CEFAZOLIN SODIUM 2 G/100ML
2 INJECTION, SOLUTION INTRAVENOUS EVERY 8 HOURS
Status: COMPLETED | OUTPATIENT
Start: 2019-01-07 | End: 2019-01-08

## 2019-01-07 RX ORDER — BUPIVACAINE HYDROCHLORIDE AND EPINEPHRINE 5; 5 MG/ML; UG/ML
INJECTION, SOLUTION EPIDURAL; INTRACAUDAL; PERINEURAL
Status: DISCONTINUED | OUTPATIENT
Start: 2019-01-07 | End: 2019-01-07 | Stop reason: HOSPADM

## 2019-01-07 RX ORDER — HYDROMORPHONE HYDROCHLORIDE 1 MG/ML
0.1 INJECTION, SOLUTION INTRAMUSCULAR; INTRAVENOUS; SUBCUTANEOUS
Status: DISCONTINUED | OUTPATIENT
Start: 2019-01-07 | End: 2019-01-08

## 2019-01-07 RX ORDER — HYDRALAZINE HYDROCHLORIDE 20 MG/ML
10 INJECTION INTRAMUSCULAR; INTRAVENOUS
Status: DISCONTINUED | OUTPATIENT
Start: 2019-01-07 | End: 2019-01-11 | Stop reason: HOSPADM

## 2019-01-07 RX ORDER — HYDRALAZINE HYDROCHLORIDE 20 MG/ML
5 INJECTION INTRAMUSCULAR; INTRAVENOUS
Status: DISCONTINUED | OUTPATIENT
Start: 2019-01-07 | End: 2019-01-08

## 2019-01-07 RX ADMIN — DEXAMETHASONE SODIUM PHOSPHATE 10 MG: 4 INJECTION, SOLUTION INTRA-ARTICULAR; INTRALESIONAL; INTRAMUSCULAR; INTRAVENOUS; SOFT TISSUE at 19:51

## 2019-01-07 RX ADMIN — FENTANYL CITRATE 50 MCG: 50 INJECTION, SOLUTION INTRAMUSCULAR; INTRAVENOUS at 17:19

## 2019-01-07 RX ADMIN — STANDARDIZED SENNA CONCENTRATE AND DOCUSATE SODIUM 2 TABLET: 8.6; 5 TABLET, FILM COATED ORAL at 18:01

## 2019-01-07 RX ADMIN — ONDANSETRON 4 MG: 2 INJECTION INTRAMUSCULAR; INTRAVENOUS at 15:36

## 2019-01-07 RX ADMIN — OXYCODONE HYDROCHLORIDE 10 MG: 10 TABLET ORAL at 21:04

## 2019-01-07 RX ADMIN — HYDRALAZINE HYDROCHLORIDE 10 MG: 20 INJECTION INTRAMUSCULAR; INTRAVENOUS at 17:56

## 2019-01-07 RX ADMIN — ACETAMINOPHEN 1000 MG: 500 TABLET, FILM COATED ORAL at 12:29

## 2019-01-07 RX ADMIN — OXYCODONE HYDROCHLORIDE 10 MG: 10 TABLET ORAL at 18:00

## 2019-01-07 RX ADMIN — FENTANYL CITRATE 50 MCG: 50 INJECTION, SOLUTION INTRAMUSCULAR; INTRAVENOUS at 15:00

## 2019-01-07 RX ADMIN — STANDARDIZED SENNA CONCENTRATE AND DOCUSATE SODIUM 2 TABLET: 8.6; 5 TABLET, FILM COATED ORAL at 05:25

## 2019-01-07 RX ADMIN — OXYCODONE HYDROCHLORIDE 10 MG: 5 SOLUTION ORAL at 15:46

## 2019-01-07 RX ADMIN — HYDROMORPHONE HYDROCHLORIDE 0.2 MG: 1 INJECTION, SOLUTION INTRAMUSCULAR; INTRAVENOUS; SUBCUTANEOUS at 15:46

## 2019-01-07 RX ADMIN — OXYCODONE HYDROCHLORIDE 10 MG: 10 TABLET ORAL at 05:26

## 2019-01-07 RX ADMIN — CEFAZOLIN SODIUM 2 G: 2 INJECTION, SOLUTION INTRAVENOUS at 17:35

## 2019-01-07 RX ADMIN — GADOBUTROL 10 ML: 604.72 INJECTION INTRAVENOUS at 07:46

## 2019-01-07 RX ADMIN — FENTANYL CITRATE 50 MCG: 50 INJECTION, SOLUTION INTRAMUSCULAR; INTRAVENOUS at 15:05

## 2019-01-07 RX ADMIN — OXYCODONE HYDROCHLORIDE 10 MG: 10 TABLET ORAL at 08:51

## 2019-01-07 RX ADMIN — DEXAMETHASONE SODIUM PHOSPHATE 10 MG: 4 INJECTION, SOLUTION INTRA-ARTICULAR; INTRALESIONAL; INTRAMUSCULAR; INTRAVENOUS; SOFT TISSUE at 05:26

## 2019-01-07 RX ADMIN — HYDROMORPHONE HYDROCHLORIDE 0.4 MG: 1 INJECTION, SOLUTION INTRAMUSCULAR; INTRAVENOUS; SUBCUTANEOUS at 15:34

## 2019-01-07 RX ADMIN — LEVOTHYROXINE SODIUM 100 MCG: 0.1 TABLET ORAL at 05:28

## 2019-01-07 RX ADMIN — POTASSIUM CHLORIDE AND SODIUM CHLORIDE: 900; 150 INJECTION, SOLUTION INTRAVENOUS at 10:52

## 2019-01-07 RX ADMIN — HYDROMORPHONE HYDROCHLORIDE 0.4 MG: 1 INJECTION, SOLUTION INTRAMUSCULAR; INTRAVENOUS; SUBCUTANEOUS at 15:13

## 2019-01-07 RX ADMIN — SODIUM CHLORIDE 500 MG: 9 INJECTION, SOLUTION INTRAVENOUS at 17:39

## 2019-01-07 RX ADMIN — FENTANYL CITRATE 50 MCG: 50 INJECTION, SOLUTION INTRAMUSCULAR; INTRAVENOUS at 19:51

## 2019-01-07 ASSESSMENT — ENCOUNTER SYMPTOMS
ABDOMINAL DISTENTION: 0
FOCAL WEAKNESS: 1
SHORTNESS OF BREATH: 0
NUMBNESS: 0
COUGH: 0
TREMORS: 1
WEAKNESS: 1
BLURRED VISION: 0
TINGLING: 0
NAUSEA: 0
CONSTIPATION: 0
HEADACHES: 0
STRIDOR: 0
VOMITING: 0
CHILLS: 0
PALPITATIONS: 0
HEADACHES: 1
ANAL BLEEDING: 0
CHEST TIGHTNESS: 0
SEIZURES: 0
DIZZINESS: 0
BLOOD IN STOOL: 0
DOUBLE VISION: 0
MYALGIAS: 0
ACTIVITY CHANGE: 0
DIARRHEA: 0
ABDOMINAL PAIN: 0
SENSORY CHANGE: 0
WHEEZING: 0
SPEECH DIFFICULTY: 0
CHOKING: 0
TREMORS: 0
FEVER: 0
APPETITE CHANGE: 0

## 2019-01-07 ASSESSMENT — PAIN SCALES - GENERAL
PAINLEVEL_OUTOF10: 4
PAINLEVEL_OUTOF10: 6
PAINLEVEL_OUTOF10: 8
PAINLEVEL_OUTOF10: 8
PAINLEVEL_OUTOF10: 6
PAINLEVEL_OUTOF10: 0
PAINLEVEL_OUTOF10: 6
PAINLEVEL_OUTOF10: 6
PAINLEVEL_OUTOF10: 3
PAINLEVEL_OUTOF10: 6
PAINLEVEL_OUTOF10: 3
PAINLEVEL_OUTOF10: 8
PAINLEVEL_OUTOF10: 9
PAINLEVEL_OUTOF10: 8

## 2019-01-07 NOTE — PROGRESS NOTES
[facility] Medicine Daily Progress Note    Date of Service  1/7/2019    Chief Complaint  68 y.o. Male with a h/o lung CA admitted 1/4/2019 with left-sided weakness.    Hospital Course    Found to have brain mass on MRI.  Suspect metastasis.  Anticipating resection on 1/7.       Interval Problem Update  1/5:  Denies pain, shortness of breath, nausea, or vomiting.  VSS.  Persistent left-sided weakness.  Denies numbness or tingling.  No visual changes.  1/6:  No significant improvement of symptoms.  Mild improvement of tremors with steroid.  Anxious about procedure.    1/7:  No acute events overnight.  Awaiting surgery.  Denies pain.  VSS.    Consultants/Specialty  NSG    Code Status  DNR    Disposition  TBD.    Review of Systems  Review of Systems   Constitutional: Negative for chills and fever.   HENT: Negative for congestion.    Eyes: Negative for blurred vision and double vision.   Respiratory: Negative for cough and shortness of breath.    Cardiovascular: Negative for chest pain, palpitations and leg swelling.   Gastrointestinal: Negative for abdominal pain, constipation, diarrhea, nausea and vomiting.   Genitourinary: Negative for dysuria.   Musculoskeletal: Negative for joint pain and myalgias.   Skin: Negative for itching and rash.   Neurological: Positive for tremors, focal weakness and weakness. Negative for dizziness, tingling, sensory change and headaches.        Physical Exam  Temp:  [36.4 °C (97.6 °F)-36.9 °C (98.4 °F)] 36.6 °C (97.8 °F)  Pulse:  [60-71] 61  Resp:  [12-18] 13  BP: (115-146)/(66-78) 134/77    Physical Exam   Constitutional: He is oriented to person, place, and time. He appears well-developed and well-nourished. No distress.   HENT:   Head: Normocephalic and atraumatic.   Mouth/Throat: No oropharyngeal exudate.   Eyes: Conjunctivae are normal. Right eye exhibits no discharge. Left eye exhibits no discharge.   Neck: Normal range of motion. No tracheal deviation present.   Cardiovascular:  Normal rate, normal heart sounds and intact distal pulses.    No murmur heard.  Pulmonary/Chest: Effort normal and breath sounds normal. No stridor. No respiratory distress. He has no wheezes.   Abdominal: Soft. Bowel sounds are normal. He exhibits no distension. There is no tenderness. There is no rebound.   Musculoskeletal: Normal range of motion. He exhibits no edema.   Neurological: He is alert and oriented to person, place, and time.   LUE strength 4/5  LLE strength 5/5   Skin: Skin is warm and dry. No rash noted. He is not diaphoretic. No erythema.   Psychiatric: He has a normal mood and affect.   Nursing note and vitals reviewed.      Fluids    Intake/Output Summary (Last 24 hours) at 01/07/19 1524  Last data filed at 01/07/19 1141   Gross per 24 hour   Intake          1463.33 ml   Output                0 ml   Net          1463.33 ml       Laboratory  Recent Labs      01/04/19 2210 01/06/19   0404  01/07/19 0221   WBC  5.0  10.8  10.0   RBC  3.77*  4.01*  3.90*   HEMOGLOBIN  12.7*  13.6*  13.1*   HEMATOCRIT  38.2*  39.6*  39.4*   MCV  101.3*  98.8*  101.0*   MCH  33.7*  33.9*  33.6*   MCHC  33.2*  34.3  33.2*   RDW  44.6  43.2  43.6   PLATELETCT  161*  178  178   MPV  8.7*  8.7*  8.7*     Recent Labs      01/04/19 2210 01/07/19 0221   SODIUM  141  137   POTASSIUM  4.5  3.8   CHLORIDE  109  108   CO2  22  23   GLUCOSE  102*  134*   BUN  20  23*   CREATININE  0.80  0.83   CALCIUM  8.9  9.3     Recent Labs      01/04/19 2210 01/07/19 0221   APTT   --   25.8   INR  1.03  1.09               Imaging  MR-BRAIN-WITH & W/O   Final Result      1.  Stealth localization demonstrating a 19 mm intra-axial enhancing mass at the right anterior frontal high convexity most consistent with brain metastasis. Associated severe vasogenic edema.   2.  Right to left midline shift.      OUTSIDE IMAGES-MR BRAIN   Final Result      OUTSIDE IMAGES-MR BRAIN   Final Result      OUTSIDE IMAGES-MR CERVICAL SPINE   Final  Result      OUTSIDE IMAGES-DX CHEST   Final Result           Assessment/Plan  * Brain metastases (HCC)   Assessment & Plan    Known lung cancer on chemo with Dr. Pérez  Now with suspect metastatic disease to Brain, evidence of vasogenic edema and shift  Continue with IV decadron   Dysphagia screen  Neuro checks   Anticipate resection today.  NSG following.  Dr. Pérez aware.       Hypothyroidism   Assessment & Plan    Continue with levothyroxine   Recheck tsh     Left-sided weakness   Assessment & Plan    Continue with neuro checks   This is due to brain metastasis and vasogenic edema  No significant improvement with decadron.     Anemia   Assessment & Plan    Mild, no evidence of bleeding  Cont to monitor      Thrombocytopenia (HCC)   Assessment & Plan    Mild, cont to monitor      Lung cancer (HCC)   Assessment & Plan    Currently on chemotherapy   Follows with Dr. Pérez as outpatient.            VTE prophylaxis: SCDs

## 2019-01-07 NOTE — CARE PLAN
Problem: Communication  Goal: The ability to communicate needs accurately and effectively will improve  Outcome: PROGRESSING AS EXPECTED  Patient communicating effectively with staff about pain, questions, and other concerns with treatment. All questions answered.     Problem: Pain Management  Goal: Pain level will decrease to patient's comfort goal  Outcome: PROGRESSING AS EXPECTED  Using oxycodone 5-10mg to treat pain as a medication option. Repositioning and ambulation also being used as techniques.

## 2019-01-07 NOTE — OR SURGEON
Immediate Post OP Note    PreOp Diagnosis: Right frontal brain tumor    PostOp Diagnosis: same    Procedure(s):  CRANIOTOMY STEALTH FRONTAL - Wound Class: Clean with Drain    Surgeon(s):  Fortino No M.D.    Anesthesiologist/Type of Anesthesia:  Anesthesiologist: Marisa Sarah M.D./General    Surgical Staff:  Circulator: Kat Padilla R.N.  Scrub Person: Lolis Newman R.N.; Tanna Larson  First Assist: JARRED Hernandez  [place]: Lexi Bañuelos R.N.  Stealth : Candace Tucker R.N.    Specimens removed if any:  ID Type Source Tests Collected by Time Destination   A : right brain mass Tissue Brain PATHOLOGY SPECIMEN Fortino No M.D. 1/7/2019  1:59 PM        Estimated Blood Loss: 50 cc    Findings: right frontal brain tumor consistent with metastasis    Complications: none        1/7/2019 2:50 PM Fortino No M.D.

## 2019-01-07 NOTE — OP REPORT
DATE OF SERVICE:  01/07/2019    PREOPERATIVE DIAGNOSES:  1.  Prior history of lung carcinoma.  2.  Right frontal brain tumor, most likely metastatic right frontal brain   tumor.    POSTOPERATIVE DIAGNOSES:  1.  Prior history of lung carcinoma.  2.  Right frontal brain tumor, most likely metastatic right frontal brain   tumor.    PROCEDURES:  1.  Right frontal craniotomy -- sterotactic with resection of right frontal   brain tumor.  2.  Stealth guidance, utilized for resection of right frontal brain tumor.    SURGEON:  Fortino No MD    ASSISTANT:  CORRINA Matias    ANESTHESIA:  General anesthetic.    ANESTHETIST:  Marisa Sarah MD    PREPARATION:  1.  Patient had preoperative MRI examination with fiducials in place for   stereotactic localization and resection of the tumor.  2.  Patient was placed on Keppra preoperatively.  3.  Patient was given Ancef intraoperatively.  Patient also had a sterile   Tiwari catheter placed.    INDICATIONS:  Patient presents with left hemiparesis, and a right frontal   brain tumor.  The indications and possible complications of the above   procedure were explained to the patient and informed consent was obtained.    DESCRIPTION OF PROCEDURE:  Patient was brought to the operating room, and   following induction, he was intubated and placed under general anesthetic   supine on the operating room table was turned to the left with a roll   underneath his right shoulder blade and buttock.  His head was secured in a   [place] head holders, and then secured to the bed itself.  His head was   turned in the semilateral position, with the nose down.    With fiducials in place, we registered the head, and we were able to localize   the tumor location.  Fiducials were removed and the head was shaved, prepped   and draped in sterile fashion.  The proposed incision site was marked out, and   injected with Marcaine 0.5% with epinephrine after a timeout.  A curvilinear   incision was made  behind the hairline in the right frontal area and carried   down to the galea.  Hemostasis was obtained with bipolar cautery and Sherrill   clips.    The flap was reflected inferiorly with the cerebellar holding it in place.    We were able to localize with the stereotaxis for localization of the tumor.    [place] holes were subsequently placed superiorly to this and the dura was   dissected free.  Craniotomy was turned with the use of the Midas Erich and the   B1 footplate.    There was hypervascularity of the dura, which was coagulated.  We opened   inferiorly and reflected towards the superior sagittal sinus.    We immediately were able to visualize the surface of the tumor, which was in   contact with the arachnoid.  Circumferential vessels were coagulated and we   cut through the tatum with the use of sharp microscissors.  We dissected around   the tumor superiorly, inferiorly, medially and then posteriorly, dissecting   the tumor free and coagulating the feeding vessels as we brought forth this 2   cm tumor.    Meticulous hemostasis was obtained with bipolar cautery.  The tumor was sent   in 1 piece.    There were some areas that looked to be involved, ____ tumor and these were   coagulated.  This made the gliotic brain, but it was almost slime like in   consistency, and it was removed as well as the tumor.  The area was copiously   irrigated.  Meticulous hemostasis was obtained.  Dura was closed with 4-0   Nurolon and Duragen.  We placed the ____ with a curvilinear plate anteriorly,   herbie hole cover and 5 mm screws.    At this time, a medium size Hemovac was placed and the galea was closed with   2-0 Vicryl suture and skin with staples.  All sponge and needle counts were   correct.  Estimated blood loss was 150 mL.       ____________________________________     MD PJ CORTEZ / ATTILA    DD:  01/07/2019 14:58:47  DT:  01/07/2019 15:37:38    D#:  [id number]  Job#:  [id number]    cc: JANICE WHARTON MD, NATALIE NAQVI MD

## 2019-01-07 NOTE — PROGRESS NOTES
Neurosurgery Progress Note    Subjective:  Patient for surgical intervention today for right frontal brain tumor whic is most likely a metastasis from his lung ca.       Exam:  Alert and oriented  Perrl  Speech fluent  Left hemiparesis which is mild, but involves the hand more than the arm.   Left leg stronger since he has been on steroids.    BP  Min: 115/66  Max: 146/78  Pulse  Av.3  Min: 60  Max: 71  Resp  Av.5  Min: 16  Max: 18  Temp  Av.8 °C (98.2 °F)  Min: 36.7 °C (98 °F)  Max: 36.9 °C (98.4 °F)  SpO2  Av.8 %  Min: 93 %  Max: 99 %    No Data Recorded    Recent Labs      19   WBC  5.0  10.8  10.0   RBC  3.77*  4.01*  3.90*   HEMOGLOBIN  12.7*  13.6*  13.1*   HEMATOCRIT  38.2*  39.6*  39.4*   MCV  101.3*  98.8*  101.0*   MCH  33.7*  33.9*  33.6*   MCHC  33.2*  34.3  33.2*   RDW  44.6  43.2  43.6   PLATELETCT  161*  178  178   MPV  8.7*  8.7*  8.7*     Recent Labs      19   SODIUM  141  137   POTASSIUM  4.5  3.8   CHLORIDE  109  108   CO2  22  23   GLUCOSE  102*  134*   BUN  20  23*   CREATININE  0.80  0.83   CALCIUM  8.9  9.3     Recent Labs      19   APTT   --   25.8   INR  1.03  1.09           Intake/Output       19 07 - 19 0659 19 07 - 19 Total [id number] Total       Intake    P.O.  --  250 250  --  -- --    P.O. -- 250 250 -- -- --    I.V.  --  463.3 463.3  --  -- --    Volume (mL) (0.9 % NaCl with KCl 20 mEq infusion) -- 463.3 463.3 -- -- --    Total Intake -- 713.3 713.3 -- -- --       Output    Urine  --  -- --  --  -- --    Number of Times Voided -- -- -- 1 x -- 1 x    Stool  --  -- --  --  -- --    Number of Times Stooled -- 0 x 0 x 0 x -- 0 x    Total Output -- -- -- -- -- --       Net I/O     -- 713.3 713.3 -- -- --            Intake/Output Summary (Last 24 hours) at 19 1023  Last data filed at 19  0400   Gross per 24 hour   Intake           713.33 ml   Output                0 ml   Net           713.33 ml            • 0.9 % NaCl with KCl 20 mEq 1,000 mL   Continuous   • dexamethasone  10 mg Q6HRS   • senna-docusate  2 Tab BID    And   • polyethylene glycol/lytes  1 Packet QDAY PRN    And   • magnesium hydroxide  30 mL QDAY PRN    And   • bisacodyl  10 mg QDAY PRN   • ondansetron  4 mg Q4HRS PRN   • ondansetron  4 mg Q4HRS PRN   • Pharmacy Consult Request   PRN    And   • oxyCODONE immediate-release  5 mg Q3HRS PRN    And   • oxyCODONE immediate-release  10 mg Q3HRS PRN    And   • HYDROmorphone  0.5 mg Q3HRS PRN   • levothyroxine  100 mcg DAILY       Assessment and Plan:  Hospital day #3  POD #3  Prophylactic anticoagulation:no       Start date/time: no    Patient for surgical intervention today  I have discussed the indications and possible complications of surgery with the patient.   Informed consent has been obtained.   He is aware of the possibility of infection, hemorrhage, stroke, seizure, and recurrence.

## 2019-01-07 NOTE — PROGRESS NOTES
Assumed care of patient form night shift RN  68 year old male  DNR/DNI  Allergies to demerol, indomethacin, meperidine  Admitted 1/4 d/t HA, left sided weakness  Pain 3/10 will medicate per MAR  A&O x 4  Cardiac: WNL  RA  LBM 1/4  Regular diet  Voiding in urinal/bathroom  Up standby with cane  Low fall risk per ramos reynoso  Skin: generalized psoriasis  PIV: 20 g R AC, SL  Plan: surgery today  All questions answered and all needs met at this time. Bed in low, locked position. Call light within reach. RN will implement hourly rounding and answer call lights appropriately.

## 2019-01-07 NOTE — THERAPY
"Occupational Therapy Evaluation Held    68 y.o. Male with a h/o lung CA admitted 1/4/2019 with left-sided weakness. Per chart, patient found to have brain mass on MRI.  Suspect metastasis.  Anticipating resection on 1/7. Will continue to follow, post op.     See \"Rehab Therapy-Acute\" Patient Summary Report for complete documentation.    "

## 2019-01-07 NOTE — PROGRESS NOTES
Bedside report received.  Assessment complete.  A&O x 4. Patient calls appropriately.  Patient in bed with standby and cane assist. Bed alarm n/a.   Patient has 4/10 pain. Denies pain interventions at this time.  Denies N&V. Tolerating regular diet, NPO at midnight with sips with medications for surgery.  + void, + flatus, - BM.  Patient denies SOB.  SCD's refused.  Patient has left sided weakness for the last 2 weeks, has not changed since admission.   Review plan with of care with patient. Call light and personal belongings with in reach. Hourly rounding in place. All needs met at this time.

## 2019-01-08 LAB
ALBUMIN SERPL BCP-MCNC: 3.1 G/DL (ref 3.2–4.9)
ALBUMIN/GLOB SERPL: 1.5 G/DL
ALP SERPL-CCNC: 40 U/L (ref 30–99)
ALT SERPL-CCNC: 7 U/L (ref 2–50)
ANION GAP SERPL CALC-SCNC: 5 MMOL/L (ref 0–11.9)
AST SERPL-CCNC: 9 U/L (ref 12–45)
BASOPHILS # BLD AUTO: 0.1 % (ref 0–1.8)
BASOPHILS # BLD: 0.01 K/UL (ref 0–0.12)
BILIRUB SERPL-MCNC: 0.3 MG/DL (ref 0.1–1.5)
BUN SERPL-MCNC: 17 MG/DL (ref 8–22)
CALCIUM SERPL-MCNC: 8.7 MG/DL (ref 8.5–10.5)
CHLORIDE SERPL-SCNC: 108 MMOL/L (ref 96–112)
CO2 SERPL-SCNC: 24 MMOL/L (ref 20–33)
CREAT SERPL-MCNC: 0.71 MG/DL (ref 0.5–1.4)
EOSINOPHIL # BLD AUTO: 0 K/UL (ref 0–0.51)
EOSINOPHIL NFR BLD: 0 % (ref 0–6.9)
ERYTHROCYTE [DISTWIDTH] IN BLOOD BY AUTOMATED COUNT: 43.1 FL (ref 35.9–50)
GLOBULIN SER CALC-MCNC: 2.1 G/DL (ref 1.9–3.5)
GLUCOSE SERPL-MCNC: 147 MG/DL (ref 65–99)
HCT VFR BLD AUTO: 39.2 % (ref 42–52)
HGB BLD-MCNC: 13.4 G/DL (ref 14–18)
IMM GRANULOCYTES # BLD AUTO: 0.04 K/UL (ref 0–0.11)
IMM GRANULOCYTES NFR BLD AUTO: 0.4 % (ref 0–0.9)
LYMPHOCYTES # BLD AUTO: 0.28 K/UL (ref 1–4.8)
LYMPHOCYTES NFR BLD: 3.1 % (ref 22–41)
MCH RBC QN AUTO: 33.8 PG (ref 27–33)
MCHC RBC AUTO-ENTMCNC: 34.2 G/DL (ref 33.7–35.3)
MCV RBC AUTO: 98.7 FL (ref 81.4–97.8)
MONOCYTES # BLD AUTO: 0.33 K/UL (ref 0–0.85)
MONOCYTES NFR BLD AUTO: 3.7 % (ref 0–13.4)
NEUTROPHILS # BLD AUTO: 8.33 K/UL (ref 1.82–7.42)
NEUTROPHILS NFR BLD: 92.7 % (ref 44–72)
NRBC # BLD AUTO: 0 K/UL
NRBC BLD-RTO: 0 /100 WBC
PLATELET # BLD AUTO: 172 K/UL (ref 164–446)
PMV BLD AUTO: 8.9 FL (ref 9–12.9)
POTASSIUM SERPL-SCNC: 3.9 MMOL/L (ref 3.6–5.5)
PROT SERPL-MCNC: 5.2 G/DL (ref 6–8.2)
RBC # BLD AUTO: 3.97 M/UL (ref 4.7–6.1)
SODIUM SERPL-SCNC: 137 MMOL/L (ref 135–145)
WBC # BLD AUTO: 9 K/UL (ref 4.8–10.8)

## 2019-01-08 PROCEDURE — 700102 HCHG RX REV CODE 250 W/ 637 OVERRIDE(OP): Performed by: HOSPITALIST

## 2019-01-08 PROCEDURE — 700111 HCHG RX REV CODE 636 W/ 250 OVERRIDE (IP): Performed by: NURSE PRACTITIONER

## 2019-01-08 PROCEDURE — 97162 PT EVAL MOD COMPLEX 30 MIN: CPT

## 2019-01-08 PROCEDURE — A9270 NON-COVERED ITEM OR SERVICE: HCPCS | Performed by: HOSPITALIST

## 2019-01-08 PROCEDURE — 80053 COMPREHEN METABOLIC PANEL: CPT

## 2019-01-08 PROCEDURE — 85025 COMPLETE CBC W/AUTO DIFF WBC: CPT

## 2019-01-08 PROCEDURE — 700102 HCHG RX REV CODE 250 W/ 637 OVERRIDE(OP): Performed by: INTERNAL MEDICINE

## 2019-01-08 PROCEDURE — 770001 HCHG ROOM/CARE - MED/SURG/GYN PRIV*

## 2019-01-08 PROCEDURE — 700105 HCHG RX REV CODE 258: Performed by: NURSE PRACTITIONER

## 2019-01-08 PROCEDURE — 700111 HCHG RX REV CODE 636 W/ 250 OVERRIDE (IP): Performed by: HOSPITALIST

## 2019-01-08 PROCEDURE — 99233 SBSQ HOSP IP/OBS HIGH 50: CPT | Performed by: HOSPITALIST

## 2019-01-08 PROCEDURE — A9270 NON-COVERED ITEM OR SERVICE: HCPCS | Performed by: INTERNAL MEDICINE

## 2019-01-08 PROCEDURE — 97166 OT EVAL MOD COMPLEX 45 MIN: CPT

## 2019-01-08 RX ORDER — OXYCODONE HYDROCHLORIDE AND ACETAMINOPHEN 5; 325 MG/1; MG/1
1 TABLET ORAL EVERY 4 HOURS PRN
Status: DISCONTINUED | OUTPATIENT
Start: 2019-01-08 | End: 2019-01-11 | Stop reason: HOSPADM

## 2019-01-08 RX ORDER — ACETAMINOPHEN 325 MG/1
650 TABLET ORAL EVERY 4 HOURS PRN
Status: DISCONTINUED | OUTPATIENT
Start: 2019-01-08 | End: 2019-01-11 | Stop reason: HOSPADM

## 2019-01-08 RX ORDER — LEVETIRACETAM 250 MG/1
500 TABLET ORAL 2 TIMES DAILY
Status: DISCONTINUED | OUTPATIENT
Start: 2019-01-08 | End: 2019-01-11 | Stop reason: HOSPADM

## 2019-01-08 RX ORDER — OMEPRAZOLE 20 MG/1
20 CAPSULE, DELAYED RELEASE ORAL DAILY
Status: DISCONTINUED | OUTPATIENT
Start: 2019-01-08 | End: 2019-01-11 | Stop reason: HOSPADM

## 2019-01-08 RX ADMIN — STANDARDIZED SENNA CONCENTRATE AND DOCUSATE SODIUM 2 TABLET: 8.6; 5 TABLET, FILM COATED ORAL at 17:41

## 2019-01-08 RX ADMIN — DEXAMETHASONE SODIUM PHOSPHATE 10 MG: 4 INJECTION, SOLUTION INTRA-ARTICULAR; INTRALESIONAL; INTRAMUSCULAR; INTRAVENOUS; SOFT TISSUE at 12:48

## 2019-01-08 RX ADMIN — CEFAZOLIN SODIUM 2 G: 2 INJECTION, SOLUTION INTRAVENOUS at 01:51

## 2019-01-08 RX ADMIN — ACETAMINOPHEN 650 MG: 325 TABLET, FILM COATED ORAL at 09:00

## 2019-01-08 RX ADMIN — OMEPRAZOLE 20 MG: 20 CAPSULE, DELAYED RELEASE ORAL at 17:41

## 2019-01-08 RX ADMIN — LEVETIRACETAM 500 MG: 500 TABLET ORAL at 17:41

## 2019-01-08 RX ADMIN — DEXAMETHASONE SODIUM PHOSPHATE 10 MG: 4 INJECTION, SOLUTION INTRA-ARTICULAR; INTRALESIONAL; INTRAMUSCULAR; INTRAVENOUS; SOFT TISSUE at 17:41

## 2019-01-08 RX ADMIN — DEXAMETHASONE SODIUM PHOSPHATE 10 MG: 4 INJECTION, SOLUTION INTRA-ARTICULAR; INTRALESIONAL; INTRAMUSCULAR; INTRAVENOUS; SOFT TISSUE at 05:06

## 2019-01-08 RX ADMIN — OXYCODONE HYDROCHLORIDE 10 MG: 10 TABLET ORAL at 00:04

## 2019-01-08 RX ADMIN — OXYCODONE HYDROCHLORIDE 10 MG: 10 TABLET ORAL at 05:05

## 2019-01-08 RX ADMIN — LEVOTHYROXINE SODIUM 100 MCG: 0.1 TABLET ORAL at 05:05

## 2019-01-08 RX ADMIN — ACETAMINOPHEN 650 MG: 325 TABLET, FILM COATED ORAL at 13:26

## 2019-01-08 RX ADMIN — ACETAMINOPHEN 650 MG: 325 TABLET, FILM COATED ORAL at 23:05

## 2019-01-08 RX ADMIN — ACETAMINOPHEN 650 MG: 325 TABLET, FILM COATED ORAL at 17:59

## 2019-01-08 RX ADMIN — SODIUM CHLORIDE 500 MG: 9 INJECTION, SOLUTION INTRAVENOUS at 05:06

## 2019-01-08 RX ADMIN — DEXAMETHASONE SODIUM PHOSPHATE 10 MG: 4 INJECTION, SOLUTION INTRA-ARTICULAR; INTRALESIONAL; INTRAMUSCULAR; INTRAVENOUS; SOFT TISSUE at 23:51

## 2019-01-08 RX ADMIN — STANDARDIZED SENNA CONCENTRATE AND DOCUSATE SODIUM 2 TABLET: 8.6; 5 TABLET, FILM COATED ORAL at 05:05

## 2019-01-08 RX ADMIN — DEXAMETHASONE SODIUM PHOSPHATE 10 MG: 4 INJECTION, SOLUTION INTRA-ARTICULAR; INTRALESIONAL; INTRAMUSCULAR; INTRAVENOUS; SOFT TISSUE at 00:04

## 2019-01-08 ASSESSMENT — PAIN SCALES - GENERAL
PAINLEVEL_OUTOF10: 5
PAINLEVEL_OUTOF10: 6
PAINLEVEL_OUTOF10: 7
PAINLEVEL_OUTOF10: 4
PAINLEVEL_OUTOF10: 6
PAINLEVEL_OUTOF10: 8
PAINLEVEL_OUTOF10: 2
PAINLEVEL_OUTOF10: 9
PAINLEVEL_OUTOF10: 2
PAINLEVEL_OUTOF10: 6
PAINLEVEL_OUTOF10: 6
PAINLEVEL_OUTOF10: 5
PAINLEVEL_OUTOF10: 4
PAINLEVEL_OUTOF10: 3

## 2019-01-08 ASSESSMENT — ENCOUNTER SYMPTOMS
COUGH: 0
PALPITATIONS: 0
FOCAL WEAKNESS: 1
HEADACHES: 0
SENSORY CHANGE: 0
TINGLING: 0
WEAKNESS: 1
VOMITING: 0
TREMORS: 1
NAUSEA: 0
DIZZINESS: 0
FEVER: 0
CHILLS: 0
MYALGIAS: 0
DIARRHEA: 0
BLURRED VISION: 0
SHORTNESS OF BREATH: 0
DOUBLE VISION: 0
ABDOMINAL PAIN: 0
CONSTIPATION: 0

## 2019-01-08 ASSESSMENT — COGNITIVE AND FUNCTIONAL STATUS - GENERAL
SUGGESTED CMS G CODE MODIFIER MOBILITY: CL
CLIMB 3 TO 5 STEPS WITH RAILING: A LOT
SUGGESTED CMS G CODE MODIFIER DAILY ACTIVITY: CK
MOBILITY SCORE: 11
HELP NEEDED FOR BATHING: A LOT
DAILY ACTIVITIY SCORE: 14
STANDING UP FROM CHAIR USING ARMS: A LITTLE
PERSONAL GROOMING: A LITTLE
DRESSING REGULAR UPPER BODY CLOTHING: A LITTLE
MOVING FROM LYING ON BACK TO SITTING ON SIDE OF FLAT BED: UNABLE
TOILETING: TOTAL
MOVING TO AND FROM BED TO CHAIR: UNABLE
WALKING IN HOSPITAL ROOM: A LITTLE
DRESSING REGULAR LOWER BODY CLOTHING: A LOT
EATING MEALS: A LITTLE
TURNING FROM BACK TO SIDE WHILE IN FLAT BAD: UNABLE

## 2019-01-08 ASSESSMENT — GAIT ASSESSMENTS
GAIT LEVEL OF ASSIST: MINIMAL ASSIST
ASSISTIVE DEVICE: SINGLE POINT CANE
DISTANCE (FEET): 110
DEVIATION: DECREASED HEEL STRIKE;DECREASED TOE OFF

## 2019-01-08 ASSESSMENT — ACTIVITIES OF DAILY LIVING (ADL): TOILETING: INDEPENDENT

## 2019-01-08 NOTE — PROGRESS NOTES
Report given to day shift RN. Pt resting in bed at this time. Call light is within reach and bed alarm is on.

## 2019-01-08 NOTE — OR NURSING
PT resting quietly at present time.  PT was medicated for 8/10 surgical pain, received Fentanyl 50mcg x 2, Dilaudid 1 mg in increments and Zofran 4mg for nausea.  PT tolerating PO well, ice chips given and Roxicodone 10mg PO given for pain.  PT w/ hemovac to closed suction and petty draining clear yellow urine.  PT a/o x2, goo lower extremity strength, RUE> LUE  and strength, pupils equal and reactive to light.  Awaiting transport to CT, report called to JERI Miranda.

## 2019-01-08 NOTE — PROGRESS NOTES
[facility] Medicine Daily Progress Note    Date of Service  1/8/2019    Chief Complaint  68 y.o. Male with a h/o lung CA admitted 1/4/2019 with left-sided weakness.    Hospital Course    Found to have brain mass on MRI.  Suspect metastasis.       Interval Problem Update  Patient seen and examined today. ICU Care  Care and plan discussed in IDT/Hot rounds.  Lines and assistive devices reviewed.    Patient tolerating treatment and therapies.  All Data, Medication data reviewed.  Case discussed with nursing as available.  Plan of Care reviewed with patient and notified of changes.  1/5:  Denies pain, shortness of breath, nausea, or vomiting.  VSS.  Persistent left-sided weakness.  Denies numbness or tingling.  No visual changes.  1/6:  No significant improvement of symptoms.  Mild improvement of tremors with steroid.  Anxious about procedure.    1/7:  No acute events overnight.  Awaiting surgery.  Denies pain.  VSS.  1/8 the patient status post craniotomy, tolerated reasonably well, still some left hand clumsiness, is alert and oriented, family at the bedside, approved for transition out of ICU per neurosurgery  Consultants/Specialty  [facility] [place]  Critical care code Status  DNR    Disposition  To neurosurgical unit    Review of Systems  Review of Systems   Constitutional: Negative for chills and fever.   HENT: Negative for congestion.    Eyes: Negative for blurred vision and double vision.   Respiratory: Negative for cough and shortness of breath.    Cardiovascular: Negative for chest pain, palpitations and leg swelling.   Gastrointestinal: Negative for abdominal pain, constipation, diarrhea, nausea and vomiting.   Genitourinary: Negative for dysuria.   Musculoskeletal: Negative for joint pain and myalgias.   Skin: Negative for itching and rash.   Neurological: Positive for tremors, focal weakness and weakness. Negative for dizziness, tingling, sensory change and headaches.        Physical Exam  Temp:  [36.2 °C (97.2 °F)-36.8 °C  (98.3 °F)] 36.8 °C (98.2 °F)  Pulse:  [47-61] 47  Resp:  [10-23] 13  BP: (134)/(77) 134/77    Physical Exam   Constitutional: He is oriented to person, place, and time. He appears well-developed and well-nourished. No distress.   HENT:   Head: Normocephalic and atraumatic.   Mouth/Throat: No oropharyngeal exudate.   Craniotomy area with drain and dressing   Eyes: Conjunctivae are normal. Right eye exhibits no discharge. Left eye exhibits no discharge.   Neck: Normal range of motion. No tracheal deviation present.   Cardiovascular: Normal rate, normal heart sounds and intact distal pulses.    No murmur heard.  Pulmonary/Chest: Effort normal and breath sounds normal. No stridor. No respiratory distress. He has no wheezes.   Abdominal: Soft. Bowel sounds are normal. He exhibits no distension. There is no tenderness. There is no rebound.   Musculoskeletal: Normal range of motion. He exhibits no edema.   Neurological: He is alert and oriented to person, place, and time.   LUE strength 4/5  LLE strength 5/5   Skin: Skin is warm and dry. No rash noted. He is not diaphoretic. No erythema.   Psychiatric: He has a normal mood and affect.   Nursing note and vitals reviewed.      Fluids    Intake/Output Summary (Last 24 hours) at 01/08/19 0751  Last data filed at 01/08/19 0600   Gross per 24 hour   Intake          2361.67 ml   Output             2425 ml   Net           -63.33 ml       Laboratory  Recent Labs      01/06/19   0404  01/07/19   0221  01/08/19   0300   WBC  10.8  10.0  9.0   RBC  4.01*  3.90*  3.97*   HEMOGLOBIN  13.6*  13.1*  13.4*   HEMATOCRIT  39.6*  39.4*  39.2*   MCV  98.8*  101.0*  98.7*   MCH  33.9*  33.6*  33.8*   MCHC  34.3  33.2*  34.2   RDW  43.2  43.6  43.1   PLATELETCT  178  178  172   MPV  8.7*  8.7*  8.9*     Recent Labs      01/07/19   0221  01/08/19   0300   SODIUM  137  137   POTASSIUM  3.8  3.9   CHLORIDE  108  108   CO2  23  24   GLUCOSE  134*  147*   BUN  23*  17   CREATININE  0.83  0.71    CALCIUM  9.3  8.7     Recent Labs      01/07/19   0221   APTT  25.8   INR  1.09               Imaging  CT-HEAD W/O   Final Result      Status post right craniotomy for resection of the right frontal lobe mass with postoperative changes as described. Associated vasogenic edema, sulci effacement, and right-to-left midline shift does not appear significantly changed.         MR-BRAIN-WITH & W/O   Final Result      1.  Stealth localization demonstrating a 19 mm intra-axial enhancing mass at the right anterior frontal high convexity most consistent with brain metastasis. Associated severe vasogenic edema.   2.  Right to left midline shift.      OUTSIDE IMAGES-MR BRAIN   Final Result      OUTSIDE IMAGES-MR BRAIN   Final Result      OUTSIDE IMAGES-MR CERVICAL SPINE   Final Result      OUTSIDE IMAGES-DX CHEST   Final Result           Assessment/Plan  * Brain metastases (HCC)   Assessment & Plan    Known lung cancer on chemo with Dr. Pérez  Now with suspect metastatic disease to Brain, evidence of vasogenic edema and shift  Continue with IV decadron   Dysphagia screen  Neuro checks   Dr. Pérez aware  Status post resection       Hypothyroidism   Assessment & Plan    Continue with levothyroxine   Recheck tsh     Left-sided weakness   Assessment & Plan    Continue with neuro checks   This is due to brain metastasis and vasogenic edema  No significant improvement with decadron.     Anemia   Assessment & Plan    Mild, no evidence of bleeding  Cont to monitor      Thrombocytopenia (HCC)   Assessment & Plan    Mild, cont to monitor      Lung cancer (HCC)   Assessment & Plan    Currently on chemotherapy   Follows with Dr. Pérez as outpatient.  Patient and family states that they would not accept any further chemotherapy       Plan  Continue with postsurgical care after metastasis resection  Await pathology  Continue with antiepileptics  Continue with Decadron  Okay to transfer to neurosurgical unit  GI prophylaxis on steroids  See  orders  Critically ill  I have performed a physical exam and reviewed and updated ROS and Plan today . In review of yesterday's note , there are no changes except as documented above.     VTE prophylaxis: SCDs

## 2019-01-08 NOTE — PROGRESS NOTES
Critical Care Progress Note    Date of admission  1/4/2019    Chief Complaint  68 y.o. male admitted 1/4/2019 with ***    Hospital Course    ***      Interval Problem Update  Reviewed last 24 hour events:  ***    Review of Systems  ROS     Vital Signs for last 24 hours   Temp:  [36.2 °C (97.2 °F)-36.8 °C (98.3 °F)] 36.8 °C (98.2 °F)  Pulse:  [48-61] 53  Resp:  [10-23] 16  BP: (134-146)/(77-78) 134/77    Hemodynamic parameters for last 24 hours       Respiratory       Physical Exam   Physical Exam    Medications  Current Facility-Administered Medications   Medication Dose Route Frequency Provider Last Rate Last Dose   • haloperidol lactate (HALDOL) injection 1 mg  1 mg Intravenous Q15 MIN PRN Marisa Sarah M.D.       • fentaNYL (SUBLIMAZE) injection 25 mcg  25 mcg Intravenous Q2 MIN PRN Marisa Sarah M.D.        Or   • fentaNYL (SUBLIMAZE) injection 50 mcg  50 mcg Intravenous Q2 MIN PRN Marisa Sarah M.D.   50 mcg at 01/07/19 1951   • HYDROmorphone pf (DILAUDID) injection 0.1 mg  0.1 mg Intravenous Q5 MIN PRN Marisa Sarah M.D.        Or   • HYDROmorphone pf (DILAUDID) injection 0.2 mg  0.2 mg Intravenous Q5 MIN PRN Marisa Sarah M.D.   0.2 mg at 01/07/19 1546    Or   • HYDROmorphone pf (DILAUDID) injection 0.4 mg  0.4 mg Intravenous Q5 MIN PRN Marisa Sarah M.D.   0.4 mg at 01/07/19 1534   • Metoprolol Tartrate (LOPRESSOR) injection 1 mg  1 mg Intravenous Q5 MIN PRN Marisa Sarah M.D.       • hydrALAZINE (APRESOLINE) injection 5 mg  5 mg Intravenous Q5 MIN PRN Marisa Sarah M.D.       • albuterol (PROVENTIL) 2.5mg/0.5ml nebulizer solution 2.5 mg  2.5 mg Inhalation Q10 MIN PRN Marisa Sarah M.D.       • Pharmacy Consult Request ...Pain Management Review 1 Each  1 Each Other PRN RANDY Hernandez.MELISSA.       • MD ALERT...DO NOT ADMINISTER NSAIDS or ASPIRIN unless ORDERED By Neurosurgery 1 Each  1 Each Other PRN RANDI Hernandez.P.RMEGHA.       • hydrALAZINE (APRESOLINE) injection 10  mg  10 mg Intravenous Q HOUR PRN Alysha Dhillon A.P.R.N.   10 mg at 01/07/19 1756   • fleet enema 133 mL  1 Each Rectal Once PRN Alysha Dhillon A.P.R.N.       • levETIRAcetam (KEPPRA) 500 mg in  mL IVPB  500 mg Intravenous Q12HRS RANDI Hernandez.P.R.N.   Stopped at 01/08/19 0521   • dexamethasone (DECADRON) injection 10 mg  10 mg Intravenous Q6HRS Amrik Will M.D.   10 mg at 01/08/19 0506   • senna-docusate (PERICOLACE or SENOKOT S) 8.6-50 MG per tablet 2 Tab  2 Tab Oral BID Amrik Will M.D.   2 Tab at 01/08/19 0505    And   • polyethylene glycol/lytes (MIRALAX) PACKET 1 Packet  1 Packet Oral QDAY PRESTHELA Will M.D.   1 Packet at 01/06/19 2334    And   • magnesium hydroxide (MILK OF MAGNESIA) suspension 30 mL  30 mL Oral QDAY PRESTHELA Will M.D.        And   • bisacodyl (DULCOLAX) suppository 10 mg  10 mg Rectal QDAY PRESTHELA Will M.D.       • ondansetron (ZOFRAN) syringe/vial injection 4 mg  4 mg Intravenous Q4HRS PRESTHELA Will M.D.       • ondansetron (ZOFRAN ODT) dispertab 4 mg  4 mg Oral Q4HRS PRESTHELA Will M.D.       • Pharmacy Consult Request ...Pain Management Review   Other PRN Amrik Will M.D.        And   • oxyCODONE immediate-release (ROXICODONE) tablet 5 mg  5 mg Oral Q3HRS PRESTHELA Will M.D.   5 mg at 01/06/19 0430    And   • oxyCODONE immediate release (ROXICODONE) tablet 10 mg  10 mg Oral Q3HRS PRESTHELA Will M.D.   10 mg at 01/08/19 0505    And   • HYDROmorphone pf (DILAUDID) injection 0.5 mg  0.5 mg Intravenous Q3HRS PRN Amrik Will M.D.   0.5 mg at 01/05/19 0606   • levothyroxine (SYNTHROID) tablet 100 mcg  100 mcg Oral DAILY Amrik Will M.D.   100 mcg at 01/08/19 0505       Fluids    Intake/Output Summary (Last 24 hours) at 01/08/19 0702  Last data filed at 01/08/19 0600   Gross per 24 hour   Intake          2361.67 ml   Output             2425 ml   Net           -63.33 ml       Laboratory           Recent Labs      01/07/19 0221 01/08/19   0300   SODIUM  137  137   POTASSIUM  3.8  3.9   CHLORIDE  108  108   CO2  23  24   BUN  23*  17   CREATININE  0.83  0.71   CALCIUM  9.3  8.7     Recent Labs      01/07/19 0221 01/08/19   0300   ALTSGPT   --   7   ASTSGOT   --   9*   ALKPHOSPHAT   --   40   TBILIRUBIN   --   0.3   GLUCOSE  134*  147*     Recent Labs      01/06/19 0404 01/07/19 0221 01/08/19   0300   WBC  10.8  10.0  9.0   NEUTSPOLYS   --    --   92.70*   LYMPHOCYTES   --    --   3.10*   MONOCYTES   --    --   3.70   EOSINOPHILS   --    --   0.00   BASOPHILS   --    --   0.10   ASTSGOT   --    --   9*   ALTSGPT   --    --   7   ALKPHOSPHAT   --    --   40   TBILIRUBIN   --    --   0.3     Recent Labs      01/06/19 0404 01/07/19 0221 01/08/19   0300   RBC  4.01*  3.90*  3.97*   HEMOGLOBIN  13.6*  13.1*  13.4*   HEMATOCRIT  39.6*  39.4*  39.2*   PLATELETCT  178  178  172   PROTHROMBTM   --   14.3   --    APTT   --   25.8   --    INR   --   1.09   --        Imaging  {IP IMAGING OPTIONS:1016927}    Assessment/Plan  * Brain metastases (HCC)   Assessment & Plan    S/p resection today  Dexamethasone per Neurosurg  ICP lowering measures; HOB elevation, laxatives, anti-tussives, fever control if found.   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 In review of yesterday's note (1/7/2019), there are no changes except as documented above.     Discussed patient condition and risk of morbidity and/or mortality with {Discussed with...:675329}  The patient remains critically ill.  Critical care time = *** minutes in directly  providing and coordinating critical care and extensive data review.  No time overlap and excludes procedures.

## 2019-01-08 NOTE — PROGRESS NOTES
Neurosurgery Progress Note    Subjective:  Pt sitting up in bed, states he is doing well, some incisional pain, denies n/v, dble vision    Exam:  AAOx3, cooperative, mota's, left arm drift, incision with drsg- c/d, hvac- 50ml    BP  Min: 134/77  Max: 134/77  Pulse  Av.9  Min: 47  Max: 75  Resp  Av.5  Min: 10  Max: 23  Temp  Av.6 °C (97.9 °F)  Min: 36.2 °C (97.2 °F)  Max: 36.8 °C (98.3 °F)  SpO2  Av.5 %  Min: 93 %  Max: 99 %    No Data Recorded    Recent Labs      19   0404  19   030   WBC  10.8  10.0  9.0   RBC  4.01*  3.90*  3.97*   HEMOGLOBIN  13.6*  13.1*  13.4*   HEMATOCRIT  39.6*  39.4*  39.2*   MCV  98.8*  101.0*  98.7*   MCH  33.9*  33.6*  33.8*   MCHC  34.3  33.2*  34.2   RDW  43.2  43.6  43.1   PLATELETCT  178  178  172   MPV  8.7*  8.7*  8.9*     Recent Labs      19   0300   SODIUM  137  137   POTASSIUM  3.8  3.9   CHLORIDE  108  108   CO2  23  24   GLUCOSE  134*  147*   BUN  23*  17   CREATININE  0.83  0.71   CALCIUM  9.3  8.7     Recent Labs      19   APTT  25.8   INR  1.09           Intake/Output       19 - 1959 19 - 19 0659      5021-2792 8115-8845 Total 6628-3190 0092-9687 Total       Intake    P.O.  --  120 120  240  -- 240    P.O. -- 120 120 240 -- 240    I.V.  850  991.7 1841.7  --  -- --    Volume (mL) (0.9 % NaCl with KCl 20 mEq infusion) 850 991.7 1841.7 -- -- --    IV Piggyback  --  400 400  --  -- --    Volume (mL) (ceFAZolin in dextrose (ANCEF) IVPB premix 2 g) -- 200 200 -- -- --    Volume (mL) (levETIRAcetam (KEPPRA) 500 mg in  mL IVPB) -- 200 200 -- -- --    Total Intake 850 1511.7 2361.7 240 -- 240       Output    Urine  775  1550 2325  --  -- --    Number of Times Voided 1 x -- 1 x -- -- --    Output (mL) (Urethral Catheter Latex;Coude 18 Fr.) 775 1550 2325 -- -- --    Drains  50  50 100  --  -- --    Output (mL) (Closed/Suction Drain 1 Right Other (Comment)  Hemova) 50 50 100 -- -- --    Stool  --  -- --  --  -- --    Number of Times Stooled 0 x 0 x 0 x -- -- --    Total Output 825 1600 2425 -- -- --       Net I/O     25 -88.3 -63.3 240 -- 240            Intake/Output Summary (Last 24 hours) at 01/08/19 0952  Last data filed at 01/08/19 0900   Gross per 24 hour   Intake          2201.67 ml   Output             2425 ml   Net          -223.33 ml            • acetaminophen  650 mg Q4HRS PRN   • Pharmacy Consult Request  1 Each PRN   • MD ALERT...DO NOT ADMINISTER NSAIDS or ASPIRIN unless ORDERED By Neurosurgery  1 Each PRN   • hydrALAZINE  10 mg Q HOUR PRN   • fleet  1 Each Once PRN   • levETIRAcetam (KEPPRA) IV  500 mg Q12HRS   • dexamethasone  10 mg Q6HRS   • senna-docusate  2 Tab BID    And   • polyethylene glycol/lytes  1 Packet QDAY PRN    And   • magnesium hydroxide  30 mL QDAY PRN    And   • bisacodyl  10 mg QDAY PRN   • ondansetron  4 mg Q4HRS PRN   • ondansetron  4 mg Q4HRS PRN   • Pharmacy Consult Request   PRN    And   • oxyCODONE immediate-release  5 mg Q3HRS PRN    And   • oxyCODONE immediate-release  10 mg Q3HRS PRN    And   • HYDROmorphone  0.5 mg Q3HRS PRN   • levothyroxine  100 mcg DAILY       Assessment and Plan:  Hospital day #5 left hemiparesis  POD #1 s/p right frontal crani for tumor resection  Prophylactic anticoagulation: no         Start date/time: tbd  NM as above  Increase activity  PT/OT  Monitor drain  CT reviewed by Dr. Yazan Morrow to floor  Neuro checks q4  Will attempt to reach Dr. Pérez- Oncologist to update on pt status

## 2019-01-08 NOTE — CARE PLAN
Problem: Safety  Goal: Will remain free from falls  Outcome: PROGRESSING AS EXPECTED  Pt is alert and oriented x4 and calls for help appropriately. Call light is within reach and pt room is located close to nurse's station.     Problem: Pain Management  Goal: Pain level will decrease to patient's comfort goal  Outcome: PROGRESSING SLOWER THAN EXPECTED  Pt continues to complain of headache pain at this time and is receiving prn pain medication. Will continue to monitor.

## 2019-01-08 NOTE — ASSESSMENT & PLAN NOTE
S/p right frontal craniotomy -- sterotactic with resection of right frontal   brain tumor. Stealth guidance  Dexamethasone 10 Q6H per NSGY  Avoid fever  Pain control   SBP <160 per Neurosurg  Using hydralazine IV PRN

## 2019-01-08 NOTE — PROGRESS NOTES
RN spoke with Dr. Price regarding morning lab orders and continuation of IV maintenance fluids. Orders received for CMP and CBC with differential and to discontinue IV maintenance fluids at this time per MD.

## 2019-01-08 NOTE — PROGRESS NOTES
1700, pt arrived to [facility] s/p R-front craniotomy, pt assessed, VSS. Pt a/o X3, follows, mota R>L (pt w/baseline L sided weakness pta). Pt updated to poc (monitor Q1 hr neuro checks, assess surgical site for s/s bleeding, strengthen ADLs) pt wife at bedside, poc discussed, will continue to monitor.

## 2019-01-08 NOTE — CONSULTS
Critical Care Consultation    Date of consult: 1/7/2019    Referring Physician  Fortino No M.D.    Reason for Consultation  Post-operative ICU care     History of Presenting Illness  68 y.o. male who presented 1/4/2019 with excessive fatigue and known h/o stage IIIB NSCLC s/p chemo/radio/imfinzi- immunotherapy 2017/2018 and was told that he was in remission. Workup for excessive fatigue and headaches showed solitary Rt frontal brain lesion 2 cm favoring metastatic etiology. He underwent Rt frontal craniotomy this AM and the lesions was successfully removed without complications. He feels ok now with no active complaints.     Code Status  DNAR/DNI    Review of Systems  Review of Systems   Constitutional: Negative for activity change and appetite change.   Respiratory: Negative for cough, choking, chest tightness, shortness of breath, wheezing and stridor.    Cardiovascular: Negative for chest pain and leg swelling.   Gastrointestinal: Negative for abdominal distention, abdominal pain, anal bleeding and blood in stool.   Neurological: Positive for weakness and headaches. Negative for tremors, seizures, syncope, speech difficulty and numbness.       Past Medical History   has a past medical history of Cancer (HCC) and Hypothyroid.    Surgical History   has a past surgical history that includes other orthopedic surgery and lung needle biopsy.    Family History  family history is not on file.    Social History   reports that he quit smoking about 5 years ago. He has quit using smokeless tobacco. He reports that he drinks alcohol. He reports that he does not use drugs.    Medications  Home Medications     Reviewed by Crissy Ramesh, Pharmacy Intern (Pharmacy Intern) on 01/05/19 at 1244  Med List Status: Complete   Medication Last Dose Status   Cyanocobalamin (VITAMIN B-12) 1000 MCG Tab 1/4/2019 Active   levothyroxine (SYNTHROID) 100 MCG Tab 1/4/2019 Active   MAGNESIUM PO 1/4/2019 Active   oxyCODONE-acetaminophen  (PERCOCET) 5-325 MG Tab 1/4/2019 Active   predniSONE (DELTASONE) 10 MG Tab 1/4/2019 Active              Current Facility-Administered Medications   Medication Dose Route Frequency Provider Last Rate Last Dose   • haloperidol lactate (HALDOL) injection 1 mg  1 mg Intravenous Q15 MIN PRN Marisa Sarah M.D.       • fentaNYL (SUBLIMAZE) injection 25 mcg  25 mcg Intravenous Q2 MIN PRN Marisa Sarah M.D.        Or   • fentaNYL (SUBLIMAZE) injection 50 mcg  50 mcg Intravenous Q2 MIN PRN Marisa Sarah M.D.   50 mcg at 01/07/19 1505   • HYDROmorphone pf (DILAUDID) injection 0.1 mg  0.1 mg Intravenous Q5 MIN PRN Marisa Sarah M.D.        Or   • HYDROmorphone pf (DILAUDID) injection 0.2 mg  0.2 mg Intravenous Q5 MIN PRN Marisa Sarah M.D.   0.2 mg at 01/07/19 1546    Or   • HYDROmorphone pf (DILAUDID) injection 0.4 mg  0.4 mg Intravenous Q5 MIN PRN Marisa Sarah M.D.   0.4 mg at 01/07/19 1534   • Metoprolol Tartrate (LOPRESSOR) injection 1 mg  1 mg Intravenous Q5 MIN PRN Marisa Sarah M.D.       • hydrALAZINE (APRESOLINE) injection 5 mg  5 mg Intravenous Q5 MIN PRN Marisa Sarah M.D.       • albuterol (PROVENTIL) 2.5mg/0.5ml nebulizer solution 2.5 mg  2.5 mg Inhalation Q10 MIN PRN Marisa Sarah M.D.       • Pharmacy Consult Request ...Pain Management Review 1 Each  1 Each Other PRN RANDI Hernandez.P.RMEGHA.       • MD ALERT...DO NOT ADMINISTER NSAIDS or ASPIRIN unless ORDERED By Neurosurgery 1 Each  1 Each Other PRN RANDI Hernandez.P.R.N.       • hydrALAZINE (APRESOLINE) injection 10 mg  10 mg Intravenous Q HOUR PRN Alysha Dhillon A.P.R.N.       • fleet enema 133 mL  1 Each Rectal Once PRN Alysha Dhillon, A.P.R.N.       • ceFAZolin in dextrose (ANCEF) IVPB premix 2 g  2 g Intravenous Q8HR Alysha Dhillon, RANDI.P.R.N.       • levETIRAcetam (KEPPRA) 500 mg in  mL IVPB  500 mg Intravenous Q12HRS RANDI Hernandez.P.R.N.       • 0.9 % NaCl with KCl 20 mEq infusion    Intravenous Continuous Irma Cid, A.P.N. 100 mL/hr at 01/07/19 1052     • dexamethasone (DECADRON) injection 10 mg  10 mg Intravenous Q6HRS Amrik Will M.D.   10 mg at 01/07/19 0526   • senna-docusate (PERICOLACE or SENOKOT S) 8.6-50 MG per tablet 2 Tab  2 Tab Oral BID Amrik Will M.D.   2 Tab at 01/07/19 0525    And   • polyethylene glycol/lytes (MIRALAX) PACKET 1 Packet  1 Packet Oral QDAY PRN Amrik Will M.D.   1 Packet at 01/06/19 2334    And   • magnesium hydroxide (MILK OF MAGNESIA) suspension 30 mL  30 mL Oral QDAY PRN Amrik Will M.D.        And   • bisacodyl (DULCOLAX) suppository 10 mg  10 mg Rectal QDAY PRN Amrik Will M.D.       • ondansetron (ZOFRAN) syringe/vial injection 4 mg  4 mg Intravenous Q4HRS PRN Amrik Will M.D.       • ondansetron (ZOFRAN ODT) dispertab 4 mg  4 mg Oral Q4HRS PRESTHELA Will M.D.       • Pharmacy Consult Request ...Pain Management Review   Other PRN Amrik Will M.D.        And   • oxyCODONE immediate-release (ROXICODONE) tablet 5 mg  5 mg Oral Q3HRS PRESTHELA Will M.D.   5 mg at 01/06/19 0430    And   • oxyCODONE immediate release (ROXICODONE) tablet 10 mg  10 mg Oral Q3HRS PRN Amrik Will M.D.   10 mg at 01/07/19 0851    And   • HYDROmorphone pf (DILAUDID) injection 0.5 mg  0.5 mg Intravenous Q3HRS PRESTHELA Will M.D.   0.5 mg at 01/05/19 0606   • levothyroxine (SYNTHROID) tablet 100 mcg  100 mcg Oral DAILY Amrik Will M.D.   100 mcg at 01/07/19 0528       Allergies  Allergies   Allergen Reactions   • Demerol    • Indomethacin Vomiting   • Meperidine Vomiting       Vital Signs last 24 hours  Temp:  [36.4 °C (97.6 °F)-36.8 °C (98.2 °F)] 36.6 °C (97.8 °F)  Pulse:  [60-71] 61  Resp:  [12-18] 13  BP: (115-146)/(66-78) 134/77    Physical Exam  Physical Exam    Fluids    Intake/Output Summary (Last 24 hours) at 01/07/19 1711  Last data filed at 01/07/19 1500   Gross per 24 hour   Intake           1463.33 ml   Output              425 ml   Net          1038.33 ml       Laboratory  Recent Results (from the past 48 hour(s))   CBC WITHOUT DIFFERENTIAL    Collection Time: 01/06/19  4:04 AM   Result Value Ref Range    WBC 10.8 4.8 - 10.8 K/uL    RBC 4.01 (L) 4.70 - 6.10 M/uL    Hemoglobin 13.6 (L) 14.0 - 18.0 g/dL    Hematocrit 39.6 (L) 42.0 - 52.0 %    MCV 98.8 (H) 81.4 - 97.8 fL    MCH 33.9 (H) 27.0 - 33.0 pg    MCHC 34.3 33.7 - 35.3 g/dL    RDW 43.2 35.9 - 50.0 fL    Platelet Count 178 164 - 446 K/uL    MPV 8.7 (L) 9.0 - 12.9 fL   Prothrombin Time    Collection Time: 01/07/19  2:21 AM   Result Value Ref Range    PT 14.3 12.0 - 14.6 sec    INR 1.09 0.87 - 1.13   APTT    Collection Time: 01/07/19  2:21 AM   Result Value Ref Range    APTT 25.8 24.7 - 36.0 sec   CBC WITHOUT DIFFERENTIAL    Collection Time: 01/07/19  2:21 AM   Result Value Ref Range    WBC 10.0 4.8 - 10.8 K/uL    RBC 3.90 (L) 4.70 - 6.10 M/uL    Hemoglobin 13.1 (L) 14.0 - 18.0 g/dL    Hematocrit 39.4 (L) 42.0 - 52.0 %    .0 (H) 81.4 - 97.8 fL    MCH 33.6 (H) 27.0 - 33.0 pg    MCHC 33.2 (L) 33.7 - 35.3 g/dL    RDW 43.6 35.9 - 50.0 fL    Platelet Count 178 164 - 446 K/uL    MPV 8.7 (L) 9.0 - 12.9 fL   BASIC METABOLIC PANEL    Collection Time: 01/07/19  2:21 AM   Result Value Ref Range    Sodium 137 135 - 145 mmol/L    Potassium 3.8 3.6 - 5.5 mmol/L    Chloride 108 96 - 112 mmol/L    Co2 23 20 - 33 mmol/L    Glucose 134 (H) 65 - 99 mg/dL    Bun 23 (H) 8 - 22 mg/dL    Creatinine 0.83 0.50 - 1.40 mg/dL    Calcium 9.3 8.5 - 10.5 mg/dL    Anion Gap 6.0 0.0 - 11.9   ESTIMATED GFR    Collection Time: 01/07/19  2:21 AM   Result Value Ref Range    GFR If African American >60 >60 mL/min/1.73 m 2    GFR If Non African American >60 >60 mL/min/1.73 m 2   HISTOLOGY REQUEST    Collection Time: 01/07/19  3:25 PM   Result Value Ref Range    Pathology Request Sent to Hist[facility]        Imaging  CT-HEAD W/O   Final Result      Status post right craniotomy for  resection of the right frontal lobe mass with postoperative changes as described. Associated vasogenic edema, sulci effacement, and right-to-left midline shift does not appear significantly changed.         MR-BRAIN-WITH & W/O   Final Result      1.  Stealth localization demonstrating a 19 mm intra-axial enhancing mass at the right anterior frontal high convexity most consistent with brain metastasis. Associated severe vasogenic edema.   2.  Right to left midline shift.      OUTSIDE IMAGES-MR BRAIN   Final Result      OUTSIDE IMAGES-MR BRAIN   Final Result      OUTSIDE IMAGES-MR CERVICAL SPINE   Final Result      OUTSIDE IMAGES-DX CHEST   Final Result          Assessment/Plan  * Brain metastases (HCC)   Assessment & Plan    S/p resection today  Dexamethasone per Neurosurg  ICP lowering measures; HOB elevation, laxatives, anti-tussives, fever control if found.   Pain control   SBP <160 per Neurosurg  Using hydralazine IV PRN      Lung cancer (HCC)   Assessment & Plan    IIIB Adenocarcinoma s/p Imfinzi and chemo+radio   Remission induced  Now has recurrent metastatic brain lesion          Discussed patient condition and risk of morbidity and/or mortality with RN.

## 2019-01-08 NOTE — PROGRESS NOTES
Pt requesting tissues to wipe his nose. Education provided to pt informing him that he should not blow his nose due to the nature of his procedure. Pt understands and states he will wipe his nose only and will avoid blowing his nose.

## 2019-01-08 NOTE — ASSESSMENT & PLAN NOTE
IIIB Adenocarcinoma s/p Imfinzi and chemo+radio   Remission induced  Now has recurrent metastatic brain lesion s/p resection

## 2019-01-08 NOTE — THERAPY
"Occupational Therapy Evaluation completed.   Functional Status:  Pt s/p R frontal crani for tumor resection, c/o L sided weakness x2 weeks. Pt performed bed mobility with min a from a raised hob, LB dressing mod to max a, petty in place, functional mobility within ICU with cga using SPC, intermittent vc's for L foot clearance. Pt demonstrates impaired motor control, motor planning, LUE shoulder 3-/5 MMT,  strength strong, noted decreased sensation, delays with distinguishing between touch/poke/pinch, impaired coordination of LUE and hand, decreased endurance, balance and generalized weakness which impacts pt's ability to perform ADLs safely and with independence. Pt will benefit from acute skilled OT services while in house and will likely benefit from intensive multidisciplinary therapies prior to d/c home and demonstrates ability to tolerate hours of therapy.    Plan of Care: Will benefit from Occupational Therapy 4 times per week  Discharge Recommendations:  Equipment: Will Continue to Assess for Equipment Needs. Post-acute therapy Recommend inpatient transitional care services for continued occupational therapy services.       See \"Rehab Therapy-Acute\" Patient Summary Report for complete documentation.    "

## 2019-01-09 LAB
ANION GAP SERPL CALC-SCNC: 8 MMOL/L (ref 0–11.9)
BUN SERPL-MCNC: 22 MG/DL (ref 8–22)
CALCIUM SERPL-MCNC: 8.7 MG/DL (ref 8.5–10.5)
CHLORIDE SERPL-SCNC: 106 MMOL/L (ref 96–112)
CO2 SERPL-SCNC: 25 MMOL/L (ref 20–33)
CREAT SERPL-MCNC: 0.81 MG/DL (ref 0.5–1.4)
ERYTHROCYTE [DISTWIDTH] IN BLOOD BY AUTOMATED COUNT: 42.7 FL (ref 35.9–50)
GLUCOSE SERPL-MCNC: 117 MG/DL (ref 65–99)
HCT VFR BLD AUTO: 39.7 % (ref 42–52)
HGB BLD-MCNC: 13.9 G/DL (ref 14–18)
MAGNESIUM SERPL-MCNC: 1.9 MG/DL (ref 1.5–2.5)
MCH RBC QN AUTO: 34.4 PG (ref 27–33)
MCHC RBC AUTO-ENTMCNC: 35 G/DL (ref 33.7–35.3)
MCV RBC AUTO: 98.3 FL (ref 81.4–97.8)
PHOSPHATE SERPL-MCNC: 2.8 MG/DL (ref 2.5–4.5)
PLATELET # BLD AUTO: 157 K/UL (ref 164–446)
PMV BLD AUTO: 8.8 FL (ref 9–12.9)
POTASSIUM SERPL-SCNC: 3.9 MMOL/L (ref 3.6–5.5)
RBC # BLD AUTO: 4.04 M/UL (ref 4.7–6.1)
SODIUM SERPL-SCNC: 139 MMOL/L (ref 135–145)
WBC # BLD AUTO: 8.1 K/UL (ref 4.8–10.8)

## 2019-01-09 PROCEDURE — A9270 NON-COVERED ITEM OR SERVICE: HCPCS | Performed by: HOSPITALIST

## 2019-01-09 PROCEDURE — 700102 HCHG RX REV CODE 250 W/ 637 OVERRIDE(OP): Performed by: HOSPITALIST

## 2019-01-09 PROCEDURE — 99233 SBSQ HOSP IP/OBS HIGH 50: CPT | Performed by: HOSPITALIST

## 2019-01-09 PROCEDURE — 84100 ASSAY OF PHOSPHORUS: CPT

## 2019-01-09 PROCEDURE — 700102 HCHG RX REV CODE 250 W/ 637 OVERRIDE(OP): Performed by: INTERNAL MEDICINE

## 2019-01-09 PROCEDURE — 80048 BASIC METABOLIC PNL TOTAL CA: CPT

## 2019-01-09 PROCEDURE — A9270 NON-COVERED ITEM OR SERVICE: HCPCS | Performed by: INTERNAL MEDICINE

## 2019-01-09 PROCEDURE — 97112 NEUROMUSCULAR REEDUCATION: CPT

## 2019-01-09 PROCEDURE — 99233 SBSQ HOSP IP/OBS HIGH 50: CPT | Performed by: INTERNAL MEDICINE

## 2019-01-09 PROCEDURE — 83735 ASSAY OF MAGNESIUM: CPT

## 2019-01-09 PROCEDURE — 97530 THERAPEUTIC ACTIVITIES: CPT

## 2019-01-09 PROCEDURE — 700111 HCHG RX REV CODE 636 W/ 250 OVERRIDE (IP): Performed by: HOSPITALIST

## 2019-01-09 PROCEDURE — 97535 SELF CARE MNGMENT TRAINING: CPT

## 2019-01-09 PROCEDURE — 97116 GAIT TRAINING THERAPY: CPT

## 2019-01-09 PROCEDURE — 85027 COMPLETE CBC AUTOMATED: CPT

## 2019-01-09 PROCEDURE — 770001 HCHG ROOM/CARE - MED/SURG/GYN PRIV*

## 2019-01-09 RX ORDER — TAMSULOSIN HYDROCHLORIDE 0.4 MG/1
0.4 CAPSULE ORAL ONCE
Status: COMPLETED | OUTPATIENT
Start: 2019-01-09 | End: 2019-01-09

## 2019-01-09 RX ORDER — ALUMINA, MAGNESIA, AND SIMETHICONE 2400; 2400; 240 MG/30ML; MG/30ML; MG/30ML
30 SUSPENSION ORAL EVERY 6 HOURS PRN
Status: DISCONTINUED | OUTPATIENT
Start: 2019-01-09 | End: 2019-01-11 | Stop reason: HOSPADM

## 2019-01-09 RX ORDER — ALPRAZOLAM 0.25 MG/1
0.25 TABLET ORAL 4 TIMES DAILY PRN
Status: DISCONTINUED | OUTPATIENT
Start: 2019-01-09 | End: 2019-01-11 | Stop reason: HOSPADM

## 2019-01-09 RX ORDER — OXYCODONE HYDROCHLORIDE 5 MG/1
5 TABLET ORAL EVERY 4 HOURS PRN
Status: DISCONTINUED | OUTPATIENT
Start: 2019-01-09 | End: 2019-01-11 | Stop reason: HOSPADM

## 2019-01-09 RX ORDER — TRAZODONE HYDROCHLORIDE 100 MG/1
50 TABLET ORAL
Status: DISCONTINUED | OUTPATIENT
Start: 2019-01-09 | End: 2019-01-11 | Stop reason: HOSPADM

## 2019-01-09 RX ORDER — TAMSULOSIN HYDROCHLORIDE 0.4 MG/1
0.4 CAPSULE ORAL
Status: DISCONTINUED | OUTPATIENT
Start: 2019-01-10 | End: 2019-01-11 | Stop reason: HOSPADM

## 2019-01-09 RX ADMIN — DEXAMETHASONE SODIUM PHOSPHATE 10 MG: 4 INJECTION, SOLUTION INTRA-ARTICULAR; INTRALESIONAL; INTRAMUSCULAR; INTRAVENOUS; SOFT TISSUE at 17:21

## 2019-01-09 RX ADMIN — OXYCODONE HYDROCHLORIDE 5 MG: 5 TABLET ORAL at 14:55

## 2019-01-09 RX ADMIN — DEXAMETHASONE SODIUM PHOSPHATE 10 MG: 4 INJECTION, SOLUTION INTRA-ARTICULAR; INTRALESIONAL; INTRAMUSCULAR; INTRAVENOUS; SOFT TISSUE at 12:15

## 2019-01-09 RX ADMIN — POLYETHYLENE GLYCOL 3350 1 PACKET: 17 POWDER, FOR SOLUTION ORAL at 17:22

## 2019-01-09 RX ADMIN — STANDARDIZED SENNA CONCENTRATE AND DOCUSATE SODIUM 2 TABLET: 8.6; 5 TABLET, FILM COATED ORAL at 17:22

## 2019-01-09 RX ADMIN — LEVETIRACETAM 500 MG: 500 TABLET ORAL at 17:22

## 2019-01-09 RX ADMIN — ALUMINUM HYDROXIDE, MAGNESIUM HYDROXIDE,SIMETHICONE 30 ML: 400; 400; 40 LIQUID ORAL at 08:16

## 2019-01-09 RX ADMIN — ALPRAZOLAM 0.25 MG: 0.25 TABLET ORAL at 14:55

## 2019-01-09 RX ADMIN — ACETAMINOPHEN 650 MG: 325 TABLET, FILM COATED ORAL at 03:39

## 2019-01-09 RX ADMIN — OXYCODONE HYDROCHLORIDE 5 MG: 5 TABLET ORAL at 19:50

## 2019-01-09 RX ADMIN — TAMSULOSIN HYDROCHLORIDE 0.4 MG: 0.4 CAPSULE ORAL at 18:14

## 2019-01-09 RX ADMIN — LEVOTHYROXINE SODIUM 100 MCG: 0.1 TABLET ORAL at 05:57

## 2019-01-09 RX ADMIN — STANDARDIZED SENNA CONCENTRATE AND DOCUSATE SODIUM 2 TABLET: 8.6; 5 TABLET, FILM COATED ORAL at 05:57

## 2019-01-09 RX ADMIN — DEXAMETHASONE SODIUM PHOSPHATE 10 MG: 4 INJECTION, SOLUTION INTRA-ARTICULAR; INTRALESIONAL; INTRAMUSCULAR; INTRAVENOUS; SOFT TISSUE at 05:57

## 2019-01-09 RX ADMIN — TRAZODONE HYDROCHLORIDE 50 MG: 100 TABLET ORAL at 03:39

## 2019-01-09 RX ADMIN — DEXAMETHASONE SODIUM PHOSPHATE 10 MG: 4 INJECTION, SOLUTION INTRA-ARTICULAR; INTRALESIONAL; INTRAMUSCULAR; INTRAVENOUS; SOFT TISSUE at 23:56

## 2019-01-09 RX ADMIN — OMEPRAZOLE 20 MG: 20 CAPSULE, DELAYED RELEASE ORAL at 05:57

## 2019-01-09 RX ADMIN — ACETAMINOPHEN 650 MG: 325 TABLET, FILM COATED ORAL at 12:15

## 2019-01-09 RX ADMIN — OXYCODONE HYDROCHLORIDE 10 MG: 10 TABLET ORAL at 23:55

## 2019-01-09 RX ADMIN — TRAZODONE HYDROCHLORIDE 50 MG: 100 TABLET ORAL at 19:50

## 2019-01-09 RX ADMIN — LEVETIRACETAM 500 MG: 500 TABLET ORAL at 05:57

## 2019-01-09 ASSESSMENT — ENCOUNTER SYMPTOMS
ABDOMINAL PAIN: 0
CONSTIPATION: 0
PALPITATIONS: 0
WEAKNESS: 0
SHORTNESS OF BREATH: 0
HEADACHES: 0
CHILLS: 0
NAUSEA: 0
HEADACHES: 1
DOUBLE VISION: 0
NERVOUS/ANXIOUS: 0
TINGLING: 0
FEVER: 0
SENSORY CHANGE: 0
VOMITING: 0
WEAKNESS: 1
DIZZINESS: 0
FALLS: 0
DIARRHEA: 0
MYALGIAS: 0
COUGH: 0
BLURRED VISION: 0
TREMORS: 1
FOCAL WEAKNESS: 1
LOSS OF CONSCIOUSNESS: 0
SEIZURES: 0

## 2019-01-09 ASSESSMENT — COGNITIVE AND FUNCTIONAL STATUS - GENERAL
CLIMB 3 TO 5 STEPS WITH RAILING: A LOT
STANDING UP FROM CHAIR USING ARMS: A LITTLE
TOILETING: TOTAL
SUGGESTED CMS G CODE MODIFIER MOBILITY: CL
TURNING FROM BACK TO SIDE WHILE IN FLAT BAD: UNABLE
PERSONAL GROOMING: A LITTLE
EATING MEALS: A LITTLE
MOBILITY SCORE: 13
MOVING TO AND FROM BED TO CHAIR: A LITTLE
MOVING FROM LYING ON BACK TO SITTING ON SIDE OF FLAT BED: UNABLE
WALKING IN HOSPITAL ROOM: A LITTLE
SUGGESTED CMS G CODE MODIFIER DAILY ACTIVITY: CK
DRESSING REGULAR LOWER BODY CLOTHING: A LOT
HELP NEEDED FOR BATHING: A LOT
DRESSING REGULAR UPPER BODY CLOTHING: A LITTLE
DAILY ACTIVITIY SCORE: 14

## 2019-01-09 ASSESSMENT — PAIN SCALES - GENERAL
PAINLEVEL_OUTOF10: 6
PAINLEVEL_OUTOF10: 7
PAINLEVEL_OUTOF10: 5
PAINLEVEL_OUTOF10: 0
PAINLEVEL_OUTOF10: 4
PAINLEVEL_OUTOF10: 0
PAINLEVEL_OUTOF10: 7

## 2019-01-09 ASSESSMENT — GAIT ASSESSMENTS
ASSISTIVE DEVICE: SINGLE POINT CANE
DEVIATION: DECREASED HEEL STRIKE
GAIT LEVEL OF ASSIST: MINIMAL ASSIST
DISTANCE (FEET): 110

## 2019-01-09 NOTE — PROGRESS NOTES
Pt complaining of insomnia and indigestion. Dr. Morgan notified, orders received for 50 mg trazodone QHS prn for insomnia and 30 ml Maalox q6h prn for indigestion. Maalox order flagged due to Demerol allergy, RN spoke with pt regarding this medication and he states he has previously tolerated it in the past.

## 2019-01-09 NOTE — PROGRESS NOTES
Neurosurgery Progress Note    Subjective:  Pt sitting up in bed, states he is doing well, some incisional pain and nausea, denies dble vision    Exam:  AAOx3, cooperative, mota's, left arm drift, incision with staples, hvac- 5ml    Pulse  Av.9  Min: 55  Max: 73  Resp  Avg: 15.4  Min: 9  Max: 20  Temp  Av.7 °C (98 °F)  Min: 36.6 °C (97.8 °F)  Max: 36.8 °C (98.3 °F)  SpO2  Av.8 %  Min: 92 %  Max: 97 %    No Data Recorded    Recent Labs      19   03019   0330   WBC  10.0  9.0  8.1   RBC  3.90*  3.97*  4.04*   HEMOGLOBIN  13.1*  13.4*  13.9*   HEMATOCRIT  39.4*  39.2*  39.7*   MCV  101.0*  98.7*  98.3*   MCH  33.6*  33.8*  34.4*   MCHC  33.2*  34.2  35.0   RDW  43.6  43.1  42.7   PLATELETCT  178  172  157*   MPV  8.7*  8.9*  8.8*     Recent Labs      19   0330   SODIUM  137  137  139   POTASSIUM  3.8  3.9  3.9   CHLORIDE  108  108  106   CO2  23  24  25   GLUCOSE  134*  147*  117*   BUN  23*  17  22   CREATININE  0.83  0.71  0.81   CALCIUM  9.3  8.7  8.7     Recent Labs      19   APTT  25.8   INR  1.09           Intake/Output       19 - 19 0659 19 07 - 01/10/19 0659      8730-4384 7273-3705 Total 7243-9572 3815-8243 Total       Intake    P.O.  1200  420 1620  --  -- --    P.O. 8266 740 6848 -- -- --    Total Intake 0210 813 5722 -- -- --       Output    Urine  350  1325 1675  --  -- --    Output (mL) (Urethral Catheter Latex;Coude 18 Fr.) 350 1325 1675 -- -- --    Drains  20  5 25  --  -- --    Output (mL) (Closed/Suction Drain 1 Right Other (Comment) Hemovac) 20 5 25 -- -- --    Stool  --  -- --  --  -- --    Number of Times Stooled -- 0 x 0 x -- -- --    Total Output 370 1330 1700 -- -- --       Net I/O     830 -910 -80 -- -- --            Intake/Output Summary (Last 24 hours) at 19 1103  Last data filed at 19 0600   Gross per 24 hour   Intake             1380 ml   Output              1700 ml   Net             -320 ml            • traZODone  50 mg QHS PRN   • mag hydrox-al hydrox-simeth  30 mL Q6HRS PRN   • acetaminophen  650 mg Q4HRS PRN   • levETIRAcetam  500 mg BID   • oxyCODONE-acetaminophen  1 Tab Q4HRS PRN   • omeprazole  20 mg DAILY   • Pharmacy Consult Request  1 Each PRN   • MD ALERT...DO NOT ADMINISTER NSAIDS or ASPIRIN unless ORDERED By Neurosurgery  1 Each PRN   • hydrALAZINE  10 mg Q HOUR PRN   • fleet  1 Each Once PRN   • dexamethasone  10 mg Q6HRS   • senna-docusate  2 Tab BID    And   • polyethylene glycol/lytes  1 Packet QDAY PRN    And   • magnesium hydroxide  30 mL QDAY PRN    And   • bisacodyl  10 mg QDAY PRN   • ondansetron  4 mg Q4HRS PRN   • ondansetron  4 mg Q4HRS PRN   • Pharmacy Consult Request   PRN    And   • oxyCODONE immediate-release  10 mg Q3HRS PRN    And   • HYDROmorphone  0.5 mg Q3HRS PRN   • levothyroxine  100 mcg DAILY       Assessment and Plan:  Hospital day #6 left hemiparesis  POD #2 s/p right frontal crani for tumor resection  Prophylactic anticoagulation: no         Start date/time: tbd  NM as above  Increase activity  PT/OT  Dc drain  CT reviewed by Dr. Yazan Morrow to floor  Neuro checks q4  Dr. Pérez- Oncologist aware of patient status

## 2019-01-09 NOTE — PROGRESS NOTES
Report received from day shift RN. Pt resting in bed at this time. Dandre light is within reach and bed alarm is on.

## 2019-01-09 NOTE — DISCHARGE PLANNING
Care Transition Team Assessment  In the case of an emergency, pt's legal NOK is Radha Mckinney /    RNCM met with pt at bedside and obtained the information used in this assessment. Pt verified accuracy of facesheet, physical address [address] . Pt lives in a 2 story home with spouse. Pt uses [facility] on [address]. Prior to current hospitalization, pt was completely independent in ADLS/IADLS. Pt drives and is able to attend necessary MD appointments.  Pt has no financial concerns. Pt has a good support system. Pt denies any hx of substance use and denies any dx of mh. Expect that pt will be able to discharge to home Friday 1/11/2019.    Information Source pt  Orientation : Oriented x 4  Information Given By: Patient  Informant's Name:  (Virgil)         Elopement Risk  Legal Hold: (P) No  Ambulatory or Self Mobile in Wheelchair: (P) No-Not an Elopement Risk  Disoriented: No  Psychiatric Symptoms: None  History of Wandering: No  Elopement this Admit: No  Vocalizing Wanting to Leave: No  Displays Behaviors, Body Language Wanting to Leave: No-Not at Risk for Elopement  Elopement Risk: (P) Not at Risk for Elopement    Interdisciplinary Discharge Planning  Does Admitting Nurse Feel This Could be a Complex Discharge?: No  Primary Care Physician:  Kirsten)  Lives with - Patient's Self Care Capacity: Spouse  Patient or legal guardian wants to designate a caregiver (see row info): No  Support Systems: Family Member(s), Friends / Neighbors  Housing / Facility: 2 Story House  Do You Take your Prescribed Medications Regularly: Yes  Mobility Issues: Yes  Prior Services: None  Patient Expects to be Discharged to::  (home)    Discharge Preparedness  What is your plan after discharge?: Home with help  Prior Functional Level: Ambulatory, Independent with Activities of Daily Living, Independent with Medication Management  Difficulity with ADLs: None  Difficulity with IADLs: None    Functional  Assesment  Prior Functional Level: Ambulatory, Independent with Activities of Daily Living, Independent with Medication Management    Finances  Financial Barriers to Discharge: No  Prescription Coverage: Yes    Vision / Hearing Impairment  Vision Impairment : Yes  Right Eye Vision: (P) Impaired, Wears Glasses  Left Eye Vision: (P) Impaired, Wears Glasses  Hearing Impairment : Yes  Hearing Impairment: (P) Both Ears, Hearing Device Not Available    Values / Beliefs / Concerns  Values / Beliefs Concerns : No         Domestic Abuse  Have you ever been the victim of abuse or violence?: No  Physical Abuse or Sexual Abuse: No  Verbal Abuse or Emotional Abuse: No  Possible Abuse Reported to:: Not Applicable    Psychological Assessment  History of Substance Abuse: None  History of Psychiatric Problems: No         Anticipated Discharge Information  Anticipated discharge disposition: Home

## 2019-01-09 NOTE — THERAPY
"Physical Therapy Evaluation completed.   Bed Mobility:  Supine to Sit: Minimal Assist  Transfers: Sit to Stand: Minimal Assist  Gait: Level Of Assist: Minimal Assist with Single Point Cane       Plan of Care: Will benefit from Physical Therapy 5 times per week  Discharge Recommendations: Equipment: Will Continue to Assess for Equipment Needs.     Pt is s/p R frontal tumor resection and presents with delayed initiation and poor motor planning. Timing between directive and motor movement is between 1-3seconds on L side. Formally, pt tests quite strong to L LE 4 to 4+/5 grossly. Functionally, however, pt with foot drag and flat foot contact during swing and initial contact. During formal sensory testing, pt accurate in localizing fine touch, deep pressure, and proprioception, however, when distracted during functional tasks, pt unable to notice L LE or UE (despite PT tapping pt repeatedly). Certainly, he is demonstrating some L inattention. While here, pt will benefit from ongoing acute PT intervention to progress his mobility and decrease fall risk. At current level, recommend acute rehab placement. Question pt's insight into his deficits as when this was discussed with pt and spouse, pt appeared unsure and wondered why he could not see his OP therapist. Spouse may require reinforcement as well as pt's deficits are not necessarily overt. Attempted to educate both.    See \"Rehab Therapy-Acute\" Patient Summary Report for complete documentation.     "

## 2019-01-09 NOTE — THERAPY
"Physical Therapy Treatment completed.   Bed Mobility:  Supine to Sit:  (up in chair )  Transfers: Sit to Stand: Minimal Assist (progressed to CGA )  Gait: Level Of Assist: Minimal Assist with Single Point Cane       Plan of Care: Will benefit from Physical Therapy 5 times per week  Discharge Recommendations: Equipment: Platform Walker and Will Continue to Assess for Equipment Needs.     See \"Rehab Therapy-Acute\" Patient Summary Report for complete documentation.     Pt is pleasant and motivated to participate in therapy. Pt's motor planning seemed to improve compared to previous tx session. Pt able to complete 110ft of gait training with Jann and SPC. Pt able to clear LLE during swing phase with decreased heel to toe. He demonstrated no LOB even when walking around environmental obstacles such as people and computers. Pt first sit to stand required Jann prior to gait training. After gait training practiced sit to stand 5x and pt able to perform with CGA. Pt will benefit from continued acute skilled therapy to progress mobility. Will continue to follow while in house   "

## 2019-01-09 NOTE — THERAPY
"Occupational Therapy Treatment completed with focus on ADLs, patient education and upper extremity function.  Functional Status:  Pt seen for OT tx today with emphasis on FM control, facilitating body awareness and facilitating motor control during self-feeding tasks, worked on grasping techniques; noted improvement in bringing hand to mouth and midline positioning of food to mouth compare to Rt side prior to session, does better with visual inputs along with vc's, STS eob and eob->chair with close cga. Pt's spouse verbalized she would prefer inpatient therapy prior to d/c home post session, pt is pleasant, gives good effort and receptive of education. Pt will continue to benefit from acute skilled OT services and post acute therapy recommended  Plan of Care: Will benefit from Occupational Therapy 4 times per week  Discharge Recommendations:  Equipment Will Continue to Assess for Equipment Needs. Post-acute therapy Recommend inpatient transitional care services for continued occupational therapy services, PMR consult recommended.       See \"Rehab Therapy-Acute\" Patient Summary Report for complete documentation.   "

## 2019-01-10 ENCOUNTER — HOSPITAL ENCOUNTER (INPATIENT)
Facility: REHABILITATION | Age: 69
End: 2019-01-10
Attending: PHYSICAL MEDICINE & REHABILITATION | Admitting: PHYSICAL MEDICINE & REHABILITATION
Payer: MEDICARE

## 2019-01-10 PROBLEM — D63.8 ANEMIA OF CHRONIC ILLNESS: Status: ACTIVE | Noted: 2019-01-04

## 2019-01-10 LAB — TSH SERPL DL<=0.005 MIU/L-ACNC: 1.73 UIU/ML (ref 0.38–5.33)

## 2019-01-10 PROCEDURE — 700102 HCHG RX REV CODE 250 W/ 637 OVERRIDE(OP): Performed by: HOSPITALIST

## 2019-01-10 PROCEDURE — A9270 NON-COVERED ITEM OR SERVICE: HCPCS | Performed by: INTERNAL MEDICINE

## 2019-01-10 PROCEDURE — 97116 GAIT TRAINING THERAPY: CPT

## 2019-01-10 PROCEDURE — 700102 HCHG RX REV CODE 250 W/ 637 OVERRIDE(OP): Performed by: INTERNAL MEDICINE

## 2019-01-10 PROCEDURE — 99232 SBSQ HOSP IP/OBS MODERATE 35: CPT | Performed by: INTERNAL MEDICINE

## 2019-01-10 PROCEDURE — 84443 ASSAY THYROID STIM HORMONE: CPT

## 2019-01-10 PROCEDURE — 700111 HCHG RX REV CODE 636 W/ 250 OVERRIDE (IP): Performed by: NURSE PRACTITIONER

## 2019-01-10 PROCEDURE — 700111 HCHG RX REV CODE 636 W/ 250 OVERRIDE (IP): Performed by: HOSPITALIST

## 2019-01-10 PROCEDURE — 36415 COLL VENOUS BLD VENIPUNCTURE: CPT

## 2019-01-10 PROCEDURE — A9270 NON-COVERED ITEM OR SERVICE: HCPCS | Performed by: HOSPITALIST

## 2019-01-10 PROCEDURE — 99222 1ST HOSP IP/OBS MODERATE 55: CPT | Performed by: PHYSICAL MEDICINE & REHABILITATION

## 2019-01-10 PROCEDURE — 770001 HCHG ROOM/CARE - MED/SURG/GYN PRIV*

## 2019-01-10 RX ORDER — DEXAMETHASONE SODIUM PHOSPHATE 4 MG/ML
6 INJECTION, SOLUTION INTRA-ARTICULAR; INTRALESIONAL; INTRAMUSCULAR; INTRAVENOUS; SOFT TISSUE EVERY 6 HOURS
Status: DISCONTINUED | OUTPATIENT
Start: 2019-01-10 | End: 2019-01-11 | Stop reason: HOSPADM

## 2019-01-10 RX ADMIN — LEVETIRACETAM 500 MG: 500 TABLET ORAL at 16:23

## 2019-01-10 RX ADMIN — OMEPRAZOLE 20 MG: 20 CAPSULE, DELAYED RELEASE ORAL at 04:35

## 2019-01-10 RX ADMIN — DEXAMETHASONE SODIUM PHOSPHATE 6 MG: 4 INJECTION, SOLUTION INTRA-ARTICULAR; INTRALESIONAL; INTRAMUSCULAR; INTRAVENOUS; SOFT TISSUE at 10:47

## 2019-01-10 RX ADMIN — TAMSULOSIN HYDROCHLORIDE 0.4 MG: 0.4 CAPSULE ORAL at 07:45

## 2019-01-10 RX ADMIN — STANDARDIZED SENNA CONCENTRATE AND DOCUSATE SODIUM 2 TABLET: 8.6; 5 TABLET, FILM COATED ORAL at 04:35

## 2019-01-10 RX ADMIN — OXYCODONE HYDROCHLORIDE 10 MG: 10 TABLET ORAL at 04:35

## 2019-01-10 RX ADMIN — DEXAMETHASONE SODIUM PHOSPHATE 6 MG: 4 INJECTION, SOLUTION INTRA-ARTICULAR; INTRALESIONAL; INTRAMUSCULAR; INTRAVENOUS; SOFT TISSUE at 16:23

## 2019-01-10 RX ADMIN — OXYCODONE HYDROCHLORIDE 5 MG: 5 TABLET ORAL at 10:46

## 2019-01-10 RX ADMIN — LEVETIRACETAM 500 MG: 500 TABLET ORAL at 04:35

## 2019-01-10 RX ADMIN — OXYCODONE HYDROCHLORIDE 5 MG: 5 TABLET ORAL at 15:22

## 2019-01-10 RX ADMIN — DEXAMETHASONE SODIUM PHOSPHATE 10 MG: 4 INJECTION, SOLUTION INTRA-ARTICULAR; INTRALESIONAL; INTRAMUSCULAR; INTRAVENOUS; SOFT TISSUE at 04:37

## 2019-01-10 RX ADMIN — LEVOTHYROXINE SODIUM 100 MCG: 0.1 TABLET ORAL at 04:35

## 2019-01-10 RX ADMIN — STANDARDIZED SENNA CONCENTRATE AND DOCUSATE SODIUM 2 TABLET: 8.6; 5 TABLET, FILM COATED ORAL at 16:23

## 2019-01-10 ASSESSMENT — GAIT ASSESSMENTS
GAIT LEVEL OF ASSIST: CONTACT GUARD ASSIST
ASSISTIVE DEVICE: SINGLE POINT CANE
DEVIATION: INCREASED BASE OF SUPPORT;DECREASED HEEL STRIKE
DISTANCE (FEET): 100

## 2019-01-10 ASSESSMENT — PATIENT HEALTH QUESTIONNAIRE - PHQ9
2. FEELING DOWN, DEPRESSED, IRRITABLE, OR HOPELESS: NOT AT ALL
SUM OF ALL RESPONSES TO PHQ9 QUESTIONS 1 AND 2: 0
1. LITTLE INTEREST OR PLEASURE IN DOING THINGS: NOT AT ALL

## 2019-01-10 ASSESSMENT — PAIN SCALES - GENERAL
PAINLEVEL_OUTOF10: 3
PAINLEVEL_OUTOF10: 8
PAINLEVEL_OUTOF10: 7
PAINLEVEL_OUTOF10: 6
PAINLEVEL_OUTOF10: 2
PAINLEVEL_OUTOF10: 8

## 2019-01-10 ASSESSMENT — ENCOUNTER SYMPTOMS
NAUSEA: 0
PALPITATIONS: 0
VOMITING: 0
TREMORS: 1
ABDOMINAL PAIN: 0
BLURRED VISION: 0
DIARRHEA: 0
FOCAL WEAKNESS: 1
FEVER: 0
DIZZINESS: 0
MYALGIAS: 0
DOUBLE VISION: 0
SENSORY CHANGE: 0
CONSTIPATION: 0
SHORTNESS OF BREATH: 0
TINGLING: 0
COUGH: 0
HEADACHES: 0
CHILLS: 0
WEAKNESS: 1

## 2019-01-10 ASSESSMENT — COGNITIVE AND FUNCTIONAL STATUS - GENERAL
SUGGESTED CMS G CODE MODIFIER MOBILITY: CJ
WALKING IN HOSPITAL ROOM: A LITTLE
STANDING UP FROM CHAIR USING ARMS: A LITTLE
MOBILITY SCORE: 20
CLIMB 3 TO 5 STEPS WITH RAILING: A LITTLE
MOVING FROM LYING ON BACK TO SITTING ON SIDE OF FLAT BED: A LITTLE

## 2019-01-10 NOTE — DISCHARGE PLANNING
Aware of PMR referral from Dr. Lang. POD 3 right frontal brain tumor removal. Forwarding to Physiatry for consult per protocol. Will follow for PMR recommendation. Thank you for the referral.

## 2019-01-10 NOTE — CARE PLAN
Problem: Infection  Goal: Will remain free from infection  Outcome: PROGRESSING AS EXPECTED    Intervention: Assess signs and symptoms of infection  VSS, Incision CDI. Hand hygiene implemented prior to and after pt care.       Problem: Bowel/Gastric:  Goal: Normal bowel function is maintained or improved  Outcome: PROGRESSING AS EXPECTED   today, 1/9/19.

## 2019-01-10 NOTE — THERAPY
"Physical Therapy Treatment completed.   Bed Mobility:  Supine to Sit: Stand by Assist  Transfers: Sit to Stand: Contact Guard Assist  Gait: Level Of Assist: Contact Guard Assist (progressed to SBA ) with Single Point Cane       Plan of Care: Will benefit from Physical Therapy 5 times per week  Discharge Recommendations: Equipment: Will Continue to Assess for Equipment Needs.   See \"Rehab Therapy-Acute\" Patient Summary Report for complete documentation.     Pt pleasant and continues to be motivated with therapy. Pt continues to make good progress with therapy. Pt able to complete bed mobility with no bed features and SBA. Pt progressed requriing SBA-CGA for sit to stand transfers with SPC. Pt completed 100ft with SPC starting with CGA but progressed to SBA. He continues to present with decreased heel to toe gait pattern but is able to clear LLE during swing phase. Pt with  no LOB with a slow megha. Pt completed stair trainingw ith SPC and close SBA. PRovided cues for seqeuncing to take one step at a time and pt demonstrated good carryover. Pt would benefit from post acute placement at discharge to maximize functional independence. But pt requesting to go home at this time due to diagnosis. Therefore will require 24/7 supervision and recommend HH. Will continue to follow while in house.   "

## 2019-01-10 NOTE — PROGRESS NOTES
[facility] Medicine Daily Progress Note    Date of Service  1/10/2019    Chief Complaint  68 y.o. Male with a h/o lung CA admitted 1/4/2019 with left-sided weakness.    Hospital Course    Found to have brain mass on MRI.  Suspect metastasis.       Interval Problem Update  1/5:  Denies pain, shortness of breath, nausea, or vomiting.  VSS.  Persistent left-sided weakness.  Denies numbness or tingling.  No visual changes.  1/6:  No significant improvement of symptoms.  Mild improvement of tremors with steroid.  Anxious about procedure.    1/7:  No acute events overnight.  Awaiting surgery.  Denies pain.  VSS.  1/8 the patient status post craniotomy, tolerated reasonably well, still some left hand clumsiness, is alert and oriented, family at the bedside, approved for transition out of ICU per neurosurgery  1/9 the patient is improved, tearful, anxious, drain is being removed, ambulating with assistance, discussed pathology results and further plan.    1/10: Pt beginning PT session.  Denies any headaches or any other neurological complaints.     Consultants/Specialty  NSG  Critical care  Oncology (discussed over phone)    Code Status  DNR    Disposition  Medically stable; pending [facility] acceptance    Review of Systems  Review of Systems   Constitutional: Negative for chills and fever.   HENT: Negative for congestion.    Eyes: Negative for blurred vision and double vision.   Respiratory: Negative for cough and shortness of breath.    Cardiovascular: Negative for chest pain, palpitations and leg swelling.   Gastrointestinal: Negative for abdominal pain, constipation, diarrhea, nausea and vomiting.   Genitourinary: Negative for dysuria.   Musculoskeletal: Negative for joint pain and myalgias.   Skin: Negative for itching and rash.   Neurological: Positive for tremors, focal weakness and weakness. Negative for dizziness, tingling, sensory change and headaches.        Physical Exam  Temp:  [36.1 °C (97 °F)-36.9 °C (98.4 °F)] 36.6  °C (97.8 °F)  Pulse:  [46-82] 60  Resp:  [15-18] 16  BP: (107-149)/(65-87) 118/72    Physical Exam   Constitutional: He is oriented to person, place, and time. He appears well-developed and well-nourished. No distress.   HENT:   Head: Normocephalic and atraumatic.   Mouth/Throat: No oropharyngeal exudate.   Craniotomy area with staples   Eyes: Conjunctivae are normal. Right eye exhibits no discharge. Left eye exhibits no discharge.   Neck: Normal range of motion. No tracheal deviation present.   Cardiovascular: Normal rate, normal heart sounds and intact distal pulses.    No murmur heard.  Pulmonary/Chest: Effort normal and breath sounds normal. No stridor. No respiratory distress. He has no wheezes.   Abdominal: Soft. Bowel sounds are normal. He exhibits no distension. There is no tenderness. There is no rebound.   Musculoskeletal: Normal range of motion. He exhibits no edema.   Neurological: He is alert and oriented to person, place, and time.   LUE strength 4/5  LLE strength 5/5   Skin: Skin is warm and dry. No rash noted. He is not diaphoretic. No erythema.   Psychiatric:   Anxious, tearful   Nursing note and vitals reviewed.      Fluids    Intake/Output Summary (Last 24 hours) at 01/10/19 1124  Last data filed at 01/10/19 0750   Gross per 24 hour   Intake              250 ml   Output              850 ml   Net             -600 ml       Laboratory  Recent Labs      01/08/19   0300  01/09/19   0330   WBC  9.0  8.1   RBC  3.97*  4.04*   HEMOGLOBIN  13.4*  13.9*   HEMATOCRIT  39.2*  39.7*   MCV  98.7*  98.3*   MCH  33.8*  34.4*   MCHC  34.2  35.0   RDW  43.1  42.7   PLATELETCT  172  157*   MPV  8.9*  8.8*     Recent Labs      01/08/19   0300  01/09/19   0330   SODIUM  137  139   POTASSIUM  3.9  3.9   CHLORIDE  108  106   CO2  24  25   GLUCOSE  147*  117*   BUN  17  22   CREATININE  0.71  0.81   CALCIUM  8.7  8.7                   Imaging  CT-HEAD W/O   Final Result      Status post right craniotomy for resection of  the right frontal lobe mass with postoperative changes as described. Associated vasogenic edema, sulci effacement, and right-to-left midline shift does not appear significantly changed.         MR-BRAIN-WITH & W/O   Final Result      1.  Stealth localization demonstrating a 19 mm intra-axial enhancing mass at the right anterior frontal high convexity most consistent with brain metastasis. Associated severe vasogenic edema.   2.  Right to left midline shift.      OUTSIDE IMAGES-MR BRAIN   Final Result      OUTSIDE IMAGES-MR BRAIN   Final Result      OUTSIDE IMAGES-MR CERVICAL SPINE   Final Result      OUTSIDE IMAGES-DX CHEST   Final Result           Assessment/Plan  * Brain metastases (HCC)- (present on admission)   Assessment & Plan    Known lung cancer on chemo with Dr. Pérez  With metastatic disease to Brain, evidence of vasogenic edema and shift  S/p resection; path results positive for metastatic lung adenocarcinoma  Continue with IV decadron; defer tapering to NSg recs   Dysphagia screen  Neuro checks   Dr. Pérez aware; possibly brain mets amenable to radiation?; defer to Onc mgmt     Hypothyroidism- (present on admission)   Assessment & Plan    Continue with levothyroxine   Recheck tsh     Left-sided weakness- (present on admission)   Assessment & Plan    Continue with neuro checks   This is due to brain metastasis and vasogenic edema  No significant improvement with decadron.     Anemia of chronic illness- (present on admission)   Assessment & Plan    Mild, no evidence of bleeding  Cont to monitor      Thrombocytopenia (HCC)- (present on admission)   Assessment & Plan    Mild, cont to monitor      Lung cancer (HCC)- (present on admission)   Assessment & Plan    Currently on chemotherapy   Follows with Dr. Pérez as outpatient.  Patient and family states that they would not accept any further chemotherapy         VTE prophylaxis: SCDs

## 2019-01-10 NOTE — CARE PLAN
Problem: Safety  Goal: Will remain free from injury  Outcome: PROGRESSING AS EXPECTED  Safety precaution in effect. Call light within reach. Reminded patient to call for assist. Hourly rounds in effect. Verbalized understanding.    Problem: Infection  Goal: Will remain free from infection  Outcome: PROGRESSING AS EXPECTED  Implement standard precaution. Hand washing every encounter & before & after patient care. Verbalized understanding.     Problem: Knowledge Deficit  Goal: Knowledge of disease process/condition, treatment plan, diagnostic tests, and medications will improve  Outcome: PROGRESSING AS EXPECTED  Discussed Plan of care. Questions answered. Verbalized understanding.    Problem: Pain Management  Goal: Pain level will decrease to patient's comfort goal    Intervention: Follow pain managment plan developed in collaboration with patient and Interdisciplinary Team  Outcome : Progressing as expected.                     Educated on pain scale. Encouraged to verbalize pain. Will medicate as per MAR.

## 2019-01-10 NOTE — PROGRESS NOTES
Critical Care Progress Note    Date of admission  1/4/2019    Chief Complaint  Post op ICU care    Hospital Course    68 y.o. male who presented 1/4/2019 with excessive fatigue and known h/o stage IIIB NSCLC s/p chemo/radio/imfinzi- immunotherapy 2017/2018 and was told that he was in remission. Workup for excessive fatigue and headaches showed solitary Rt frontal brain lesion 2 cm favoring metastatic etiology. He underwent Rt frontal craniotomy this AM and the lesions was successfully removed without complications. He feels ok now with no active complaints      Interval Problem Update  Reviewed last 24 hour events:  No acute changes overnight  Hemodynamically stable  Tolerating PO intake  Afebrile  Mental status is normal  Good UOP    Review of Systems  Review of Systems   Constitutional: Negative for chills, fever and malaise/fatigue.   HENT: Negative for nosebleeds.         + headache and pain from surgical incision in right scalp   Respiratory: Negative for cough and shortness of breath.    Cardiovascular: Negative for chest pain and palpitations.   Gastrointestinal: Negative for abdominal pain, nausea and vomiting.   Genitourinary: Negative for dysuria and urgency.   Musculoskeletal: Negative for falls.   Skin: Negative for rash.   Neurological: Positive for headaches. Negative for seizures, loss of consciousness and weakness.   Psychiatric/Behavioral: The patient is not nervous/anxious.         Vital Signs for last 24 hours   Temp:  [36.1 °C (97 °F)-37.1 °C (98.8 °F)] 36.1 °C (97 °F)  Pulse:  [52-84] 58  Resp:  [10-20] 16  BP: (149)/(87) 149/87    Hemodynamic parameters for last 24 hours       Respiratory       Physical Exam   Physical Exam   Constitutional: He is oriented to person, place, and time. He appears well-nourished. No distress.   Conversational  Comfortable, not in distress     HENT:   Head: Normocephalic and atraumatic.   Mouth/Throat: Oropharynx is clear and moist.   Surgical incision clean and  dry on right temporoparietal area.    Eyes: Conjunctivae are normal. No scleral icterus.   Neck: Neck supple.   Cardiovascular: Normal rate, regular rhythm and normal heart sounds.    No murmur heard.  Pulmonary/Chest: Effort normal. No respiratory distress. He has no wheezes. He has rales.   Abdominal: Soft. He exhibits no distension. There is no tenderness. There is no rebound and no guarding.   Diminished bowel sound   Musculoskeletal: He exhibits no edema.   Neurological: He is alert and oriented to person, place, and time. No cranial nerve deficit. He exhibits normal muscle tone. Coordination normal.   Skin: Skin is warm. No rash noted. He is not diaphoretic. No erythema. No pallor.   Nursing note and vitals reviewed.      Medications  Current Facility-Administered Medications   Medication Dose Route Frequency Provider Last Rate Last Dose   • traZODone (DESYREL) tablet 50 mg  50 mg Oral QHS PRN Pedro Morgan M.D.   50 mg at 01/09/19 1950   • mag hydrox-al hydrox-simeth (MAALOX PLUS ES or MYLANTA DS) suspension 30 mL  30 mL Oral Q6HRS PRN Pedro Morgan M.D.   30 mL at 01/09/19 0816   • oxyCODONE immediate-release (ROXICODONE) tablet 5 mg  5 mg Oral Q4HRS PRN Arnold Lux M.D.   5 mg at 01/09/19 1950   • ALPRAZolam (XANAX) tablet 0.25 mg  0.25 mg Oral 4X/DAY PRN Arnold Lux M.D.   0.25 mg at 01/09/19 1455   • [START ON 1/10/2019] tamsulosin (FLOMAX) capsule 0.4 mg  0.4 mg Oral AFTER BREAKFAST Arnold Lux M.D.       • acetaminophen (TYLENOL) tablet 650 mg  650 mg Oral Q4HRS PRN Shobha Nelson M.D.   650 mg at 01/09/19 1215   • levETIRAcetam (KEPPRA) tablet 500 mg  500 mg Oral BID Shobha Nelson M.D.   500 mg at 01/09/19 1722   • oxyCODONE-acetaminophen (PERCOCET) 5-325 MG per tablet 1 Tab  1 Tab Oral Q4HRS PRN Arnold Lux M.D.       • omeprazole (PRILOSEC) capsule 20 mg  20 mg Oral DAILY Arnold Lux M.D.   20 mg at 01/09/19 0557   • Pharmacy Consult Request  ...Pain Management Review 1 Each  1 Each Other PRN TAVON HernandezPAbramRMEGHA.       • MD ALERT...DO NOT ADMINISTER NSAIDS or ASPIRIN unless ORDERED By Neurosurgery 1 Each  1 Each Other PRN RANDI Hernandez.P.R.N.       • hydrALAZINE (APRESOLINE) injection 10 mg  10 mg Intravenous Q HOUR PRN TAVON HernandezP.R.NAbram   10 mg at 01/07/19 1756   • fleet enema 133 mL  1 Each Rectal Once PRN RANDI Hernandez.P.RMEGHA.       • dexamethasone (DECADRON) injection 10 mg  10 mg Intravenous Q6HRS Amrik Will M.D.   10 mg at 01/09/19 1721   • senna-docusate (PERICOLACE or SENOKOT S) 8.6-50 MG per tablet 2 Tab  2 Tab Oral BID Amrik Will M.D.   2 Tab at 01/09/19 1722    And   • polyethylene glycol/lytes (MIRALAX) PACKET 1 Packet  1 Packet Oral QDAY PRN Amrik Will M.D.   1 Packet at 01/09/19 1722    And   • magnesium hydroxide (MILK OF MAGNESIA) suspension 30 mL  30 mL Oral QDAY PRESTHELA Will M.D.        And   • bisacodyl (DULCOLAX) suppository 10 mg  10 mg Rectal QDAY PRESTHELA Will M.D.       • ondansetron (ZOFRAN) syringe/vial injection 4 mg  4 mg Intravenous Q4HRS PRESTHELA Will M.D.       • ondansetron (ZOFRAN ODT) dispertab 4 mg  4 mg Oral Q4HRS PRESTHELA Will M.D.       • Pharmacy Consult Request ...Pain Management Review   Other PRN Amrik Will M.D.        And   • oxyCODONE immediate release (ROXICODONE) tablet 10 mg  10 mg Oral Q3HRS PRESTHELA Will M.D.   10 mg at 01/08/19 0505    And   • HYDROmorphone pf (DILAUDID) injection 0.5 mg  0.5 mg Intravenous Q3HRS PRN Amrik Will M.D.   0.5 mg at 01/05/19 0606   • levothyroxine (SYNTHROID) tablet 100 mcg  100 mcg Oral DAILY Amrik Will M.D.   100 mcg at 01/09/19 0557       Fluids    Intake/Output Summary (Last 24 hours) at 01/09/19 1953  Last data filed at 01/09/19 1200   Gross per 24 hour   Intake              820 ml   Output             1870 ml   Net            -1050 ml       Laboratory           Recent Labs      01/07/19 0221 01/08/19 0300 01/09/19   0330   SODIUM  137  137  139   POTASSIUM  3.8  3.9  3.9   CHLORIDE  108  108  106   CO2  23  24  25   BUN  23*  17  22   CREATININE  0.83  0.71  0.81   MAGNESIUM   --    --   1.9   PHOSPHORUS   --    --   2.8   CALCIUM  9.3  8.7  8.7     Recent Labs      01/07/19 0221 01/08/19 0300 01/09/19   0330   ALTSGPT   --   7   --    ASTSGOT   --   9*   --    ALKPHOSPHAT   --   40   --    TBILIRUBIN   --   0.3   --    GLUCOSE  134*  147*  117*     Recent Labs      01/07/19 0221 01/08/19 0300 01/09/19   0330   WBC  10.0  9.0  8.1   NEUTSPOLYS   --   92.70*   --    LYMPHOCYTES   --   3.10*   --    MONOCYTES   --   3.70   --    EOSINOPHILS   --   0.00   --    BASOPHILS   --   0.10   --    ASTSGOT   --   9*   --    ALTSGPT   --   7   --    ALKPHOSPHAT   --   40   --    TBILIRUBIN   --   0.3   --      Recent Labs      01/07/19 0221 01/08/19 0300 01/09/19   0330   RBC  3.90*  3.97*  4.04*   HEMOGLOBIN  13.1*  13.4*  13.9*   HEMATOCRIT  39.4*  39.2*  39.7*   PLATELETCT  178  172  157*   PROTHROMBTM  14.3   --    --    APTT  25.8   --    --    INR  1.09   --    --        Imaging  X-Ray:  I have personally reviewed the images and compared with prior images.  CT:    Reviewed    Assessment/Plan  * Brain metastases (HCC)   Assessment & Plan    S/p right frontal craniotomy -- sterotactic with resection of right frontal   brain tumor. Stealth guidance  Dexamethasone 10 Q6H per NSGY  Avoid fever  Pain control   SBP <160 per Neurosurg  Using hydralazine IV PRN      Lung cancer (HCC)   Assessment & Plan    IIIB Adenocarcinoma s/p Imfinzi and chemo+radio   Remission induced  Now has recurrent metastatic brain lesion s/p resection         Ok to leave ICU to floor from my standpoint     VTE:  Contraindicated, will defer to NSGY  Ulcer: Not Indicated  Lines: None    I have performed a physical exam and reviewed and updated ROS and Plan today (1/9/2019). In  review of yesterday's note (1/8/2019), there are no changes except as documented above.     Discussed patient condition and risk of morbidity and/or mortality with Hospitalist, RN, RT, Pharmacy, Charge nurse / hot rounds and Patient

## 2019-01-10 NOTE — FACE TO FACE
Face to Face Note  -  Durable Medical Equipment    Vimal Lang M.D. - NPI: [id number]  I certify that this patient is under my care and that they have had a durable medical equipment(DME)face to face encounter by myself that meets the physician DME face-to-face encounter requirements with this patient on:    Date of encounter:   Patient:                    MRN:                       YOB: 2017  Virgil Mckinney  [id number]  1950     The encounter with the patient was in whole, or in part, for the following medical condition, which is the primary reason for durable medical equipment:  Other - Brain Metastasis    I certify that, based on my findings, the following durable medical equipment is medically necessary:  Other DME Equipment - Single Point Cane.    HOME O2 Saturation Measurements:(Values must be present for Home Oxygen orders)         ,     ,         My Clinical findings support the need for the above equipment due to:  Abnormal Gait    Supporting Symptoms: NONE     ------------------------------------------------------------------------------------------------------------------    Face to Face Supporting Documentation - Home Health    The encounter with this patient was in whole or in part the primary reason for home health admission.    Date of encounter:   Patient:                    MRN:                       YOB: 2017  Virgil Mckinney  [id number]  1950     Home health to see patient for:  Skilled Nursing care for assessment, interventions & education, Physical Therapy evaluation and treatment and Occupational therapy evaluation and treatment    Skilled need for:  Surgical Aftercare Brain Metastatic Lesion resection    Skilled nursing interventions to include:  Comment: PT/OT services    Homebound evidenced status by:  Need the aid of supportive devices such as crutches, canes, wheelchairs or walkers. Leaving home must require a considerable and taxing  effort. There must exist a normal inability to leave the home.    Community Physician to provide follow up care: Karrie Gonzalez M.D.     Optional Interventions    Wound information & treatment:    Home Infusion Therapy orders:    Line/Drain/Airway:    I certify the face to face encounter for this home care referral meets the CMS requirements and the encounter/clinical assessment with the patient was, in whole, or in part, for the medical condition(s) listed above, which is the primary reason for home health care. Based on my clinical findings: the service(s) are medically necessary, support the need for home health care, and the homebound criteria are met.  I certify that this patient has had a face to face encounter by myself.  Vimal Lang M.D. - NPI: [id number]    *Debility, frailty and advanced age in the absence of an acute deterioration or exacerbation of a condition do not qualify a patient for home health.

## 2019-01-10 NOTE — DISCHARGE PLANNING
Anticipated Discharge Disposition:   Home with outpt therapy    Action:   Talked to pt and wife.   She said that she is retired and able to provide 24 hr care and supervision to pt.   She also bought a single point cane for pt which is at the bedside.   They do not want homehealth , wife said she prefers outpt PT everyday.     Barriers to Discharge:   Surgical /medical clearance    Plan:   Follow up with MD   Update RN  Update family.     Addendum: MD indicated that pt will be discharge tomorrow.  Notified pt and wife.  IMM letter given. Explained to pt, pt signed, copy to pt and to chart.    Addendum:  Pt was walking with CNA and pt lost balance and left leg folded thus pt almost fell.   Explained to pt and wife that it may be safer to go to SNF.   Gave pt a list of SNF in [facility] and also choices for SNF in [place].  Pt still insisting go home. Family will talk to pt.

## 2019-01-10 NOTE — PROGRESS NOTES
[facility] Medicine Daily Progress Note    Date of Service  1/9/2019    Chief Complaint  68 y.o. Male with a h/o lung CA admitted 1/4/2019 with left-sided weakness.    Hospital Course    Found to have brain mass on MRI.  Suspect metastasis.       Interval Problem Update  Patient seen and examined today. ICU Care  Care and plan discussed in IDT/Hot rounds.  Lines and assistive devices reviewed.    Patient tolerating treatment and therapies.  All Data, Medication data reviewed.  Case discussed with nursing as available.  Plan of Care reviewed with patient and notified of changes.  1/5:  Denies pain, shortness of breath, nausea, or vomiting.  VSS.  Persistent left-sided weakness.  Denies numbness or tingling.  No visual changes.  1/6:  No significant improvement of symptoms.  Mild improvement of tremors with steroid.  Anxious about procedure.    1/7:  No acute events overnight.  Awaiting surgery.  Denies pain.  VSS.  1/8 the patient status post craniotomy, tolerated reasonably well, still some left hand clumsiness, is alert and oriented, family at the bedside, approved for transition out of ICU per neurosurgery  1/9 the patient is improved, tearful, anxious, drain is being removed, ambulating with assistance, discussed pathology results and further plan.  Consultants/Specialty  NSG  Critical care  Code Status  DNR    Disposition  To neurosurgical unit    Review of Systems  Review of Systems   Constitutional: Negative for chills and fever.   HENT: Negative for congestion.    Eyes: Negative for blurred vision and double vision.   Respiratory: Negative for cough and shortness of breath.    Cardiovascular: Negative for chest pain, palpitations and leg swelling.   Gastrointestinal: Negative for abdominal pain, constipation, diarrhea, nausea and vomiting.   Genitourinary: Negative for dysuria.   Musculoskeletal: Negative for joint pain and myalgias.   Skin: Negative for itching and rash.   Neurological: Positive for tremors,  focal weakness and weakness. Negative for dizziness, tingling, sensory change and headaches.        Physical Exam  Temp:  [36.1 °C (97 °F)-37.1 °C (98.8 °F)] 36.1 °C (97 °F)  Pulse:  [52-84] (P) 58  Resp:  [10-20] 16  BP: (149)/(87) 149/87    Physical Exam   Constitutional: He is oriented to person, place, and time. He appears well-developed and well-nourished. No distress.   HENT:   Head: Normocephalic and atraumatic.   Mouth/Throat: No oropharyngeal exudate.   Craniotomy area with staples   Eyes: Conjunctivae are normal. Right eye exhibits no discharge. Left eye exhibits no discharge.   Neck: Normal range of motion. No tracheal deviation present.   Cardiovascular: Normal rate, normal heart sounds and intact distal pulses.    No murmur heard.  Pulmonary/Chest: Effort normal and breath sounds normal. No stridor. No respiratory distress. He has no wheezes.   Abdominal: Soft. Bowel sounds are normal. He exhibits no distension. There is no tenderness. There is no rebound.   Musculoskeletal: Normal range of motion. He exhibits no edema.   Neurological: He is alert and oriented to person, place, and time.   LUE strength 4/5  LLE strength 5/5   Skin: Skin is warm and dry. No rash noted. He is not diaphoretic. No erythema.   Psychiatric:   Anxious, tearful   Nursing note and vitals reviewed.      Fluids    Intake/Output Summary (Last 24 hours) at 01/09/19 1829  Last data filed at 01/09/19 1200   Gross per 24 hour   Intake              820 ml   Output             1870 ml   Net            -1050 ml       Laboratory  Recent Labs      01/07/19   0221  01/08/19   0300  01/09/19   0330   WBC  10.0  9.0  8.1   RBC  3.90*  3.97*  4.04*   HEMOGLOBIN  13.1*  13.4*  13.9*   HEMATOCRIT  39.4*  39.2*  39.7*   MCV  101.0*  98.7*  98.3*   MCH  33.6*  33.8*  34.4*   MCHC  33.2*  34.2  35.0   RDW  43.6  43.1  42.7   PLATELETCT  178  172  157*   MPV  8.7*  8.9*  8.8*     Recent Labs      01/07/19   0221  01/08/19   0300  01/09/19   0330    SODIUM  137  137  139   POTASSIUM  3.8  3.9  3.9   CHLORIDE  108  108  106   CO2  23  24  25   GLUCOSE  134*  147*  117*   BUN  23*  17  22   CREATININE  0.83  0.71  0.81   CALCIUM  9.3  8.7  8.7     Recent Labs      01/07/19   0221   APTT  25.8   INR  1.09               Imaging  CT-HEAD W/O   Final Result      Status post right craniotomy for resection of the right frontal lobe mass with postoperative changes as described. Associated vasogenic edema, sulci effacement, and right-to-left midline shift does not appear significantly changed.         MR-BRAIN-WITH & W/O   Final Result      1.  Stealth localization demonstrating a 19 mm intra-axial enhancing mass at the right anterior frontal high convexity most consistent with brain metastasis. Associated severe vasogenic edema.   2.  Right to left midline shift.      OUTSIDE IMAGES-MR BRAIN   Final Result      OUTSIDE IMAGES-MR BRAIN   Final Result      OUTSIDE IMAGES-MR CERVICAL SPINE   Final Result      OUTSIDE IMAGES-DX CHEST   Final Result           Assessment/Plan  * Brain metastases (HCC)   Assessment & Plan    Known lung cancer on chemo with Dr. Pérez  Now with suspect metastatic disease to Brain, evidence of vasogenic edema and shift  Continue with IV decadron   Dysphagia screen  Neuro checks   Dr. Pérez aware  Status post resection       Hypothyroidism   Assessment & Plan    Continue with levothyroxine   Recheck tsh     Left-sided weakness   Assessment & Plan    Continue with neuro checks   This is due to brain metastasis and vasogenic edema  No significant improvement with decadron.     Anemia   Assessment & Plan    Mild, no evidence of bleeding  Cont to monitor      Thrombocytopenia (HCC)   Assessment & Plan    Mild, cont to monitor      Lung cancer (HCC)   Assessment & Plan    Currently on chemotherapy   Follows with Dr. Pérez as outpatient.  Patient and family states that they would not accept any further chemotherapy       Plan  Continue with  postsurgical care after metastasis resection  On favorable pathology, possible outpatient radiation therapy  Continue with antiepileptics  Continue with Decadron, currently significantly high dose, to clarify with neurosurgery to taper  Okay to transfer to neurosurgical unit  GI prophylaxis on steroids  Anxiolytics and headache control  See orders  Critically ill, but stabilized for transfer to neurosurgical unit  I have performed a physical exam and reviewed and updated ROS and Plan today . In review of yesterday's note , there are no changes except as documented above.     VTE prophylaxis: SCDs

## 2019-01-10 NOTE — PROGRESS NOTES
1046 Patient's in bed. Medicated with Oxycodone (see MAR) for c/o's headache, rates pain 5/10.    1230 Patient's sitting up in bed, having lunch. No distress noted. Spouse visiting.

## 2019-01-10 NOTE — PROGRESS NOTES
Neurosurgery Progress Note    Subjective:  Pt sitting up in bed, also seen by Dr Borja, states he is doing well, some incisional pain, denies dble vision, nausea improved    Exam:  AAOx3, cooperative, mota's, left arm drift, incision with staples- some dry blood at incision    BP  Min: 107/67  Max: 149/87  Pulse  Av.5  Min: 46  Max: 84  Resp  Av  Min: 15  Max: 20  Temp  Av.6 °C (97.9 °F)  Min: 36.1 °C (97 °F)  Max: 36.9 °C (98.5 °F)  SpO2  Av.4 %  Min: 93 %  Max: 99 %    No Data Recorded    Recent Labs      19   0300  19   0330   WBC  9.0  8.1   RBC  3.97*  4.04*   HEMOGLOBIN  13.4*  13.9*   HEMATOCRIT  39.2*  39.7*   MCV  98.7*  98.3*   MCH  33.8*  34.4*   MCHC  34.2  35.0   RDW  43.1  42.7   PLATELETCT  172  157*   MPV  8.9*  8.8*     Recent Labs      19   0300  19   0330   SODIUM  137  139   POTASSIUM  3.9  3.9   CHLORIDE  108  106   CO2  24  25   GLUCOSE  147*  117*   BUN  17  22   CREATININE  0.71  0.81   CALCIUM  8.7  8.7               Intake/Output       19 - 01/10/19 0659 01/10/19 0700 - 1959       Total  Total       Intake    P.O.  400  100 500  --  -- --    P.O. 400 100 500 -- -- --    Total Intake 400 100 500 -- -- --       Output    Urine  540  -- 540  550  -- 550    Number of Times Voided -- 2 x 2 x 1 x -- 1 x    Urine Void (mL) -- -- -- 550 -- 550    Output (mL) ([REMOVED] Urethral Catheter Latex;Coude 18 Fr.) 540 -- 540 -- -- --    Drains  0  -- 0  --  -- --    Output (mL) ([REMOVED] Closed/Suction Drain 1 Right Other (Comment) Hemovac) 0 -- 0 -- -- --    Stool  --  -- --  --  -- --    Number of Times Stooled 1 x 1 x 2 x -- -- --    Total Output 540 -- 540 550 -- 550       Net I/O     -140 100 -40 -550 -- -550            Intake/Output Summary (Last 24 hours) at 01/10/19 0918  Last data filed at 01/10/19 0750   Gross per 24 hour   Intake              350 ml   Output              950 ml   Net              -600 ml       $ Bladder Scan Results (mL): 288    • dexamethasone  6 mg Q6HRS   • traZODone  50 mg QHS PRN   • mag hydrox-al hydrox-simeth  30 mL Q6HRS PRN   • oxyCODONE immediate-release  5 mg Q4HRS PRN   • ALPRAZolam  0.25 mg 4X/DAY PRN   • tamsulosin  0.4 mg AFTER BREAKFAST   • acetaminophen  650 mg Q4HRS PRN   • levETIRAcetam  500 mg BID   • oxyCODONE-acetaminophen  1 Tab Q4HRS PRN   • omeprazole  20 mg DAILY   • Pharmacy Consult Request  1 Each PRN   • MD ALERT...DO NOT ADMINISTER NSAIDS or ASPIRIN unless ORDERED By Neurosurgery  1 Each PRN   • hydrALAZINE  10 mg Q HOUR PRN   • fleet  1 Each Once PRN   • senna-docusate  2 Tab BID    And   • polyethylene glycol/lytes  1 Packet QDAY PRN    And   • magnesium hydroxide  30 mL QDAY PRN    And   • bisacodyl  10 mg QDAY PRN   • ondansetron  4 mg Q4HRS PRN   • ondansetron  4 mg Q4HRS PRN   • Pharmacy Consult Request   PRN    And   • oxyCODONE immediate-release  10 mg Q3HRS PRN    And   • HYDROmorphone  0.5 mg Q3HRS PRN   • levothyroxine  100 mcg DAILY       Assessment and Plan:  Hospital day #7 left hemiparesis  POD #3 s/p right frontal crani for tumor resection  Prophylactic anticoagulation: no         Start date/time: tbd  NM as above  Increase activity  PT/OT  CT reviewed by Dr. No- ok  Neuro checks q4  Dr. Pérez- Oncologist aware of patient status  Will reduce decadron

## 2019-01-10 NOTE — PROGRESS NOTES
1625 Report over the phone received from Meg ICU RN. Per report patient doesn't need tele monitor.    1657 Received patient from SICU via bed accompanied by Meg ICU RN.    1722 Patient's in bed. No distress noted. Fall Protocol in effect. Call light within reach. Reminded patient to call for assist. Due medications given, medicated with Miralax (see MAR).

## 2019-01-10 NOTE — PROGRESS NOTES
1522 Patient's in bed. Medicated with Oxycodone (see MAR) for c/o's headache, rates pain 8/10. Spouse visiting.

## 2019-01-10 NOTE — PROGRESS NOTES
0655 Patient's in bed. Bedside report receieved from NOC RN (Tammi). No distress noted.    0745 Patient's sitting up in bed, having Breakfast. Fall Protocol in effect. Call light within reach. Reminded patient to call for assist. Assessment completed. No distress noted. Plan of care reviewed with the patient. Verbalized understanding. Due medication given.    0910 BRUNILDA Alves worked with the patient. Ambulated patient with a cane in the hallway.    0940 Patient in bed, no distress noted.

## 2019-01-10 NOTE — PROGRESS NOTES
2 RN skin assessment done. Noted dry/flaky bilateral lower & bilateral upper extremities, dusky BLE. Generalized psoriasis. Right upper side surgical incision (Crani) with staples, BREANNA. No pressure ulcer noted.

## 2019-01-10 NOTE — PROGRESS NOTES
Bedside report recieved, accepted care. Pt is A&Ox4, reports 7/10 pain, medicated per mar. Denies numbness/tingling. Incision R head BREANNA.     POC discussed. No further needs at this time.    in use. Bed alarm on. Bed locked and in bed in low position, call light and belongings within reach, upper rails up. Treaded socks on. SCD's on.

## 2019-01-10 NOTE — PROGRESS NOTES
1755 Patient hasn't voided, Bladder scan was done and the result was 288 ml.    1801 Placed a call to Dr Vaughn, awaiting for response.    1803 Received a call from Dr Lux,  notiifed MD that patient's FC was dc'd in SICU @1200, and the bladder scan result was 288 ml. New order received & acknowledged to give Flomax 0.4 mg po once & repeat Bladder scan in 2 hours and every 6 hours prn. If the result is >300 ml, straight cath every 6 hours prn.

## 2019-01-10 NOTE — CONSULTS
Physical Medicine and Rehabilitation Consultation         Initial Consult      Date of Consultation: 1/10/2019  Consulting provider: Jesus Watts D.O.  Reason for consultation: assess for acute inpatient rehab appropriateness  Consulting physician Vimal Lang    Chief complaint: Left-sided weakness    HPI: The patient is a 68 y.o. male with a past medical history of non-small cell lung cancer, thought to be in remission, hypothyroid, presented on 1/4/2019 10:04 PM with 3-week history of left upper and lower extremity weakness, slurred speech.  He was found to have new intra-axial enhancing mass in the superior mid frontal lobe on the right, consistent with metastatic disease, vasogenic edema as well as some right to left herniation.  He is status post right frontal craniotomy with mass resection.     Oncology: Followed by Dr. Pérez    He admits to localized incisional pain 3-4 out of 10 aching progresses to sharp with exertion, constant, no radiation, improved with rest, started after surgery, dysarthria and speech has significantly improved after surgery, no change in strength, denies blurry vision, double vision, changes in smell, changes in taste    Patient is interested in going home given his current diagnosis and prognosis.  He wants to get out of the hospital as soon as possible, and enjoys last few months.    Patient had episode of near fall with therapy today    ROS:  Gen: No fatigue, confusion, significant weight loss  Eyes: no blurry vision, double vision or pain in eyes  ENT: no changes in hearing, runny nose, nose bleeds, sinus pain  Endo: no episodes of low blood sugar  CV: No CP, palpitations,   Lungs: no shortness of breath, changes in secretions, changes in cough, pain with coughing  Abd: No abd pain, nausea, vomiting, pain with eating  : no blood in urine, suprapubic pain  Ext/MSK: No swelling in legs, asymmetric atrophy  Neuro: no changes in strength or sensation  Skin: no new  ulcers/skin breakdown appreciated by patient  Mood: No changes in mood today, increase in depression or anxiety  Heme: no bruising, or bleeding  Allergy: No recent allergies    PMH:  Past Medical History:   Diagnosis Date   • Cancer (HCC)     Lung Cancer   • Hypothyroid        PSH:  Past Surgical History:   Procedure Laterality Date   • CRANIOTOMY STEALTH Right 1/7/2019    Procedure: CRANIOTOMY STEALTH FRONTAL;  Surgeon: Fortino No M.D.;  [facility]: SURGERY [facility];  Service: Neurosurgery   • LUNG NEEDLE BIOPSY     • OTHER ORTHOPEDIC SURGERY      knee cardicell       FHX:  History reviewed. No pertinent family history.  No family history of lung cancer  Medications:  Current Facility-Administered Medications   Medication Dose   • dexamethasone (DECADRON) injection 6 mg  6 mg   • traZODone (DESYREL) tablet 50 mg  50 mg   • mag hydrox-al hydrox-simeth (MAALOX PLUS ES or MYLANTA DS) suspension 30 mL  30 mL   • oxyCODONE immediate-release (ROXICODONE) tablet 5 mg  5 mg   • ALPRAZolam (XANAX) tablet 0.25 mg  0.25 mg   • tamsulosin (FLOMAX) capsule 0.4 mg  0.4 mg   • acetaminophen (TYLENOL) tablet 650 mg  650 mg   • levETIRAcetam (KEPPRA) tablet 500 mg  500 mg   • oxyCODONE-acetaminophen (PERCOCET) 5-325 MG per tablet 1 Tab  1 Tab   • omeprazole (PRILOSEC) capsule 20 mg  20 mg   • Pharmacy Consult Request ...Pain Management Review 1 Each  1 Each   • MD ALERT...DO NOT ADMINISTER NSAIDS or ASPIRIN unless ORDERED By Neurosurgery 1 Each  1 Each   • hydrALAZINE (APRESOLINE) injection 10 mg  10 mg   • fleet enema 133 mL  1 Each   • senna-docusate (PERICOLACE or SENOKOT S) 8.6-50 MG per tablet 2 Tab  2 Tab    And   • polyethylene glycol/lytes (MIRALAX) PACKET 1 Packet  1 Packet    And   • magnesium hydroxide (MILK OF MAGNESIA) suspension 30 mL  30 mL    And   • bisacodyl (DULCOLAX) suppository 10 mg  10 mg   • ondansetron (ZOFRAN) syringe/vial injection 4 mg  4 mg   • ondansetron (ZOFRAN ODT) dispertab 4 mg  4 mg   •  "Pharmacy Consult Request ...Pain Management Review      And   • oxyCODONE immediate release (ROXICODONE) tablet 10 mg  10 mg    And   • HYDROmorphone pf (DILAUDID) injection 0.5 mg  0.5 mg   • levothyroxine (SYNTHROID) tablet 100 mcg  100 mcg       Allergies:  Allergies   Allergen Reactions   • Demerol    • Indomethacin Vomiting   • Meperidine Vomiting       Social Hx:  Pre-morbidly, this patient lived in a single level home with One steps to enter, with spouse.   Tobacco: Quit smoking about 5 years ago  Alcohol: Positive EtOH  Drugs: None    Prior level of function:   Independent    Current level of function:    Occupational Therapy Treatment completed with focus on ADLs, patient education and upper extremity function.  Functional Status:  Pt seen for OT tx today with emphasis on FM control, facilitating body awareness and facilitating motor control during self-feeding tasks, worked on grasping techniques; noted improvement in bringing hand to mouth and midline positioning of food to mouth compare to Rt side prior to session, does better with visual inputs along with vc's, STS eob and eob->chair with close cga. Pt's spouse verbalized she would prefer inpatient therapy prior to d/c home post session, pt is pleasant, gives good effort and receptive of education. Pt will continue to benefit from acute skilled OT services and post acute therapy recommended    Physical Therapy Treatment completed.   Bed Mobility:  Supine to Sit:  (up in chair )  Transfers: Sit to Stand: Minimal Assist (progressed to CGA )  Gait: Level Of Assist: Minimal Assist with Single Point Cane        Physical Exam:  Vitals: Blood pressure 118/72, pulse 60, temperature 36.6 °C (97.8 °F), temperature source Temporal, resp. rate 16, height 1.88 m (6' 2\"), weight 86 kg (189 lb 9.6 oz), SpO2 96 %.  Gen: NAD  HEENT: NC/AT, PERRLA, moist mucous membranes  Cardio: RRR, no mumurs  Pulm: CTAB, with normal respiratory effort  Abd: Soft NTND, active bowel " sounds,   Ext: No peripheral edema. No calf tenderness. No clubbing/cyanosis. +dorsal pedalis pulses bilaterally.    Mental status: answers questions appropriately follows commands  Speech: fluent, no aphasia or dysarthria    CRANIAL NERVES:  2,3: visual acuity grossly intact, PERRL  3,4,6: EOMI bilaterally, no nystagmus or diplopia  5: sensation intact to light touch bilaterally and symmetric  7: no facial asymmetry  8: hearing grossly intact  9,10: symmetric palate elevation  11: SCM/Trapezius strength 5/5 bilaterally  12: tongue protrudes midline    Motor:  Right-sided myotomes 5 out of 5 left sided myotomes 4 out of 5    Sensory:   intact to light touch through out    DTRs: 2+ in bilateral biceps, triceps, brachioradialis, 2+ in bilateral patellar and achilles tendons  No clonus at bilateral ankles  Negative babinski b/l  Negative Armstrong b/l     Tone: no spasticity noted, no cogwheeling noted      Labs:  Recent Labs      01/08/19   0300  01/09/19   0330   RBC  3.97*  4.04*   HEMOGLOBIN  13.4*  13.9*   HEMATOCRIT  39.2*  39.7*   PLATELETCT  172  157*     Recent Labs      01/08/19   0300  01/09/19   0330   SODIUM  137  139   POTASSIUM  3.9  3.9   CHLORIDE  108  106   CO2  24  25   GLUCOSE  147*  117*   BUN  17  22   CREATININE  0.71  0.81   CALCIUM  8.7  8.7     Recent Results (from the past 24 hour(s))   TSH WITH REFLEX TO FT4    Collection Time: 01/10/19 10:18 AM   Result Value Ref Range    TSH 1.730 0.380 - 5.330 uIU/mL   MRI of brain 1/7  1.  Stealth localization demonstrating a 19 mm intra-axial enhancing mass at the right anterior frontal high convexity most consistent with brain metastasis. Associated severe vasogenic edema.  2.  Right to left midline shift.    ASSESSMENT:  Metastatic Tumor: Non-small cell lung cancer, positive left-sided weakness, MRI of brain 1/7 personally evaluated shows intra-axial, enhancing mass on the right frontal convexity with right to left midline shift, status post  resection  -Discussed about brain radiation, not planning on other chemotherapy or radiation.  -Discussed impulsivity, recommendations on slowing down actions, decreasing fall risk, in setting of new craniotomy  -Recommend consulting speech therapy for cognitive evaluation, this can be done as an outpatient    Non-small cell lung cancer:  -Management as per oncology  -He is interested in going home as soon as possible    Rehabilitation: Impaired ADLs and mobility  -Patient is resistant to doing inpatient rehabilitation, he wants to go home with home health.  Discussed with him benefits of acute rehab in the cancer population.  -Recommend including physical occupational and speech therapy for home health    Discussed with pt and family, summarized hospitalization and care, options for next step of care    Discussed with team about recommendations     Jesus Watts D.O.  Physical Medicine and Rehabilitation

## 2019-01-11 ENCOUNTER — PATIENT OUTREACH (OUTPATIENT)
Dept: HEALTH INFORMATION MANAGEMENT | Facility: OTHER | Age: 69
End: 2019-01-11

## 2019-01-11 VITALS
RESPIRATION RATE: 16 BRPM | DIASTOLIC BLOOD PRESSURE: 64 MMHG | HEIGHT: 74 IN | OXYGEN SATURATION: 94 % | SYSTOLIC BLOOD PRESSURE: 118 MMHG | BODY MASS INDEX: 24.33 KG/M2 | HEART RATE: 61 BPM | WEIGHT: 189.6 LBS | TEMPERATURE: 98.4 F

## 2019-01-11 PROCEDURE — 700111 HCHG RX REV CODE 636 W/ 250 OVERRIDE (IP): Performed by: NURSE PRACTITIONER

## 2019-01-11 PROCEDURE — 700102 HCHG RX REV CODE 250 W/ 637 OVERRIDE(OP): Performed by: INTERNAL MEDICINE

## 2019-01-11 PROCEDURE — A9270 NON-COVERED ITEM OR SERVICE: HCPCS | Performed by: HOSPITALIST

## 2019-01-11 PROCEDURE — A9270 NON-COVERED ITEM OR SERVICE: HCPCS | Performed by: INTERNAL MEDICINE

## 2019-01-11 PROCEDURE — 700102 HCHG RX REV CODE 250 W/ 637 OVERRIDE(OP): Performed by: HOSPITALIST

## 2019-01-11 PROCEDURE — 99239 HOSP IP/OBS DSCHRG MGMT >30: CPT | Performed by: INTERNAL MEDICINE

## 2019-01-11 PROCEDURE — 97535 SELF CARE MNGMENT TRAINING: CPT

## 2019-01-11 RX ORDER — TAMSULOSIN HYDROCHLORIDE 0.4 MG/1
0.4 CAPSULE ORAL
Qty: 30 CAP | Refills: 0 | Status: SHIPPED | OUTPATIENT
Start: 2019-01-12 | End: 2021-01-19

## 2019-01-11 RX ORDER — OXYCODONE HYDROCHLORIDE AND ACETAMINOPHEN 5; 325 MG/1; MG/1
1 TABLET ORAL EVERY 4 HOURS PRN
Qty: 15 TAB | Refills: 0 | Status: SHIPPED | OUTPATIENT
Start: 2019-01-11 | End: 2019-01-16 | Stop reason: ALTCHOICE

## 2019-01-11 RX ORDER — LEVETIRACETAM 500 MG/1
500 TABLET ORAL 2 TIMES DAILY
Qty: 60 TAB | Refills: 0 | Status: SHIPPED | OUTPATIENT
Start: 2019-01-11 | End: 2021-01-19

## 2019-01-11 RX ORDER — DEXAMETHASONE 2 MG/1
TABLET ORAL
Qty: 36 TAB | Refills: 0 | Status: SHIPPED | OUTPATIENT
Start: 2019-01-11 | End: 2021-01-19

## 2019-01-11 RX ORDER — OMEPRAZOLE 20 MG/1
20 CAPSULE, DELAYED RELEASE ORAL DAILY
Qty: 30 CAP | Refills: 0 | Status: SHIPPED | OUTPATIENT
Start: 2019-01-12 | End: 2021-01-19

## 2019-01-11 RX ADMIN — LEVETIRACETAM 500 MG: 500 TABLET ORAL at 04:47

## 2019-01-11 RX ADMIN — TAMSULOSIN HYDROCHLORIDE 0.4 MG: 0.4 CAPSULE ORAL at 07:45

## 2019-01-11 RX ADMIN — OXYCODONE HYDROCHLORIDE 5 MG: 5 TABLET ORAL at 12:36

## 2019-01-11 RX ADMIN — LEVOTHYROXINE SODIUM 100 MCG: 0.1 TABLET ORAL at 04:47

## 2019-01-11 RX ADMIN — ALPRAZOLAM 0.25 MG: 0.25 TABLET ORAL at 00:18

## 2019-01-11 RX ADMIN — DEXAMETHASONE SODIUM PHOSPHATE 6 MG: 4 INJECTION, SOLUTION INTRA-ARTICULAR; INTRALESIONAL; INTRAMUSCULAR; INTRAVENOUS; SOFT TISSUE at 00:13

## 2019-01-11 RX ADMIN — DEXAMETHASONE SODIUM PHOSPHATE 6 MG: 4 INJECTION, SOLUTION INTRA-ARTICULAR; INTRALESIONAL; INTRAMUSCULAR; INTRAVENOUS; SOFT TISSUE at 04:50

## 2019-01-11 RX ADMIN — DEXAMETHASONE SODIUM PHOSPHATE 6 MG: 4 INJECTION, SOLUTION INTRA-ARTICULAR; INTRALESIONAL; INTRAMUSCULAR; INTRAVENOUS; SOFT TISSUE at 11:17

## 2019-01-11 RX ADMIN — OXYCODONE HYDROCHLORIDE 10 MG: 10 TABLET ORAL at 00:18

## 2019-01-11 RX ADMIN — OMEPRAZOLE 20 MG: 20 CAPSULE, DELAYED RELEASE ORAL at 04:47

## 2019-01-11 RX ADMIN — STANDARDIZED SENNA CONCENTRATE AND DOCUSATE SODIUM 2 TABLET: 8.6; 5 TABLET, FILM COATED ORAL at 04:47

## 2019-01-11 RX ADMIN — OXYCODONE HYDROCHLORIDE 5 MG: 5 TABLET ORAL at 07:45

## 2019-01-11 ASSESSMENT — PAIN SCALES - GENERAL
PAINLEVEL_OUTOF10: 7
PAINLEVEL_OUTOF10: 7
PAINLEVEL_OUTOF10: 8
PAINLEVEL_OUTOF10: 4

## 2019-01-11 ASSESSMENT — COGNITIVE AND FUNCTIONAL STATUS - GENERAL
SUGGESTED CMS G CODE MODIFIER DAILY ACTIVITY: CI
HELP NEEDED FOR BATHING: A LITTLE
DAILY ACTIVITIY SCORE: 23

## 2019-01-11 ASSESSMENT — PATIENT HEALTH QUESTIONNAIRE - PHQ9
SUM OF ALL RESPONSES TO PHQ9 QUESTIONS 1 AND 2: 0
1. LITTLE INTEREST OR PLEASURE IN DOING THINGS: NOT AT ALL
2. FEELING DOWN, DEPRESSED, IRRITABLE, OR HOPELESS: NOT AT ALL

## 2019-01-11 NOTE — DISCHARGE SUMMARY
Discharge Summary    CHIEF COMPLAINT ON ADMISSION  Chief Complaint   Patient presents with   • Headache     Patient transfered from [facility]. Patient initially went to the ER for a headache x2 days with left sided weakness x2 weeks. CT scan revealed new brain tumor. Patient A&O x4 in no apparent distress on arrival.        Reason for Admission  Metastatic cancer to brain (HCC)     Admission Date  1/4/2019    CODE STATUS  DNAR/DNI    HPI & HOSPITAL COURSE  This is a 68 y.o. male here with PMHx of lung adenocarcinoma on chemotherapy was admitted for progressive headache due to brain mass noted on CT imaging at outside hospital prior to transfer.  Neurosurgery was consulted upon arrival and performed right frontal craniotomy with right frontal mass resection.  Patient was started on Keppra and IV Decadron.  Pathology results from the mass was positive for lung adenocarcinoma.  Patient's Oncologist was notified of the results and recommended outpatient follow up to discuss/arrange further treatment.  In the meantime, PT/OT evaluation was completed and recommended transitional care to acute inpatient rehab to continue PT/OT services.  Patient and family refused rehab.  We also offered home with home health with PT/OT services which patient and family also declined.  Outpatient PT/OT prescription was provided to the patient per their request.  Patient will continue oral steroid taper and Keppra as outpatient and follow up with NEurosurgery in 2 weeks for staple removal.  Patient will also follow up with his Oncologist in 1-2 weeks.     Therefore, he is discharged in fair and stable condition to home with close outpatient follow-up.    The patient met 2-midnight criteria for an inpatient stay at the time of discharge.    Discharge Date  1/11/2019    FOLLOW UP ITEMS POST DISCHARGE  F/U with Neurosurgery in 2 weeks for staple removal  F/U with Oncology in 1-2 weeks     DISCHARGE DIAGNOSES  Principal Problem:    Brain  metastases (HCC) POA: Yes  Active Problems:    Lung cancer (HCC) POA: Yes    Thrombocytopenia (HCC) POA: Yes    Anemia of chronic illness POA: Yes    Left-sided weakness POA: Yes    Hypothyroidism POA: Yes  Resolved Problems:    * No resolved hospital problems. *      FOLLOW UP  No future appointments.  Karrie Gonzalez M.D.  [address] [facility] [address]  [phone]            MEDICATIONS ON DISCHARGE     Medication List      START taking these medications      Instructions   dexamethasone 2 MG tablet  Commonly known as:  DECADRON   Take 3 tabs PO BID for 3 days; then take 2 tabs PO BID for 3 days; then take 1 tab PO BID for 3 days; then resume home Prednisone.     levETIRAcetam 500 MG Tabs  Commonly known as:  KEPPRA   Take 1 Tab by mouth 2 Times a Day.  Dose:  500 mg     omeprazole 20 MG delayed-release capsule  Start taking on:  1/12/2019  Commonly known as:  PRILOSEC   Take 1 Cap by mouth every day.  Dose:  20 mg     tamsulosin 0.4 MG capsule  Start taking on:  1/12/2019  Commonly known as:  FLOMAX   Take 1 Cap by mouth ONE-HALF HOUR AFTER BREAKFAST.  Dose:  0.4 mg        CONTINUE taking these medications      Instructions   levothyroxine 100 MCG Tabs  Commonly known as:  SYNTHROID   Take 100 mcg by mouth every day.  Dose:  100 mcg     MAGNESIUM PO   Take 1 Tab by mouth every day.  Dose:  1 Tab     oxyCODONE-acetaminophen 5-325 MG Tabs  Commonly known as:  PERCOCET   Take 1 Tab by mouth every four hours as needed.  Dose:  1 Tab     predniSONE 10 MG Tabs  Commonly known as:  DELTASONE   Take 10 mg by mouth every day.  Dose:  10 mg     vitamin B-12 1000 MCG Tabs   Take 1,000 mcg by mouth every day.  Dose:  1000 mcg            Allergies  Allergies   Allergen Reactions   • Demerol    • Indomethacin Vomiting   • Meperidine Vomiting       DIET  Orders Placed This Encounter   Procedures   • Diet Order Regular     Standing Status:   Standing     Number of Occurrences:   1     Order Specific  Question:   Diet:     Answer:   Regular [1]       ACTIVITY  As tolerated.  Weight bearing as tolerated    CONSULTATIONS  Neurosurgery  Pulmonary/Critical Care  Physical Medicine    PROCEDURES  1/7: Right frontal craniotomy-- stereotactic with resection of right frontal brain tumor    LABORATORY  Lab Results   Component Value Date    SODIUM 139 01/09/2019    POTASSIUM 3.9 01/09/2019    CHLORIDE 106 01/09/2019    CO2 25 01/09/2019    GLUCOSE 117 (H) 01/09/2019    BUN 22 01/09/2019    CREATININE 0.81 01/09/2019        Lab Results   Component Value Date    WBC 8.1 01/09/2019    HEMOGLOBIN 13.9 (L) 01/09/2019    HEMATOCRIT 39.7 (L) 01/09/2019    PLATELETCT 157 (L) 01/09/2019        Total time of the discharge process exceeds 43 minutes.

## 2019-01-11 NOTE — DISCHARGE PLANNING
note:  Met with pt and family about safe discharge   Asked them again if they would consider AVS like SNF or HH   Pt refused saying he really wants to go home.   Wife also wants to take pt for outpt therapy. They said that   They have a good set up at home.   Wife will buy a shower chair.   Pt does not want a walker. He currently has a cane.   PT agreeable with the SPC that wife purchased for pt.   Wife said that she will make an appointment with the oncologist.     Addendum:  Per Dr. Lang, pt is ok to be discharge.

## 2019-01-11 NOTE — PROGRESS NOTES
1455  Discharge instructions given to the patient & spouse. Verbalized understanding. Patient was discharged with patient's belongings, own cane,  e prescriptions for Keppra, Decadron, Prilosec., Percocet (hard copy) & Tamsulosin via w/c accompanied by one  Female USR & spouse via Private car.

## 2019-01-11 NOTE — CARE PLAN
Problem: Safety  Goal: Will remain free from injury  Outcome: PROGRESSING AS EXPECTED  Safety precaution in place. Non skid socks in use. Call light within reach. Reminded patient to call for assist. Hourly round in effect. Verbalized understanding.    Problem: Infection  Goal: Will remain free from infection  Outcome: PROGRESSING AS EXPECTED  Implement standard precaution. Hand washing every encounter & before & after patient care. Verbalized understanding.    Problem: Knowledge Deficit  Goal: Knowledge of disease process/condition, treatment plan, diagnostic tests, and medications will improve  Outcome: PROGRESSING AS EXPECTED  Discussed Plan of care with patient & spouse. Questions answered. Verbalized understanding.    Problem: Pain Management  Goal: Pain level will decrease to patient's comfort goal    Intervention: Follow pain managment plan developed in collaboration with patient and Interdisciplinary Team  Outcome : Progressing as expected.                    Educated on pain scale. Encouraged to verbalize pain. Will medicate as per MAR.

## 2019-01-11 NOTE — PROGRESS NOTES
Neurosurgery Progress Note    Subjective:  Pt sitting up in chair, states he is doing well, some incisional pain, denies dble vision, nausea improved, declined rehab and HH    Exam:  AAOx3, cooperative, mota's, left arm drift, incision with staples- some dry blood at incision    BP  Min: 102/60  Max: 129/68  Pulse  Av  Min: 59  Max: 63  Resp  Avg: 15.8  Min: 15  Max: 16  Temp  Av °C (98.6 °F)  Min: 36.8 °C (98.2 °F)  Max: 37.5 °C (99.5 °F)  SpO2  Av.3 %  Min: 92 %  Max: 93 %    No Data Recorded    Recent Labs      19   0330   WBC  8.1   RBC  4.04*   HEMOGLOBIN  13.9*   HEMATOCRIT  39.7*   MCV  98.3*   MCH  34.4*   MCHC  35.0   RDW  42.7   PLATELETCT  157*   MPV  8.8*     Recent Labs      19   0330   SODIUM  139   POTASSIUM  3.9   CHLORIDE  106   CO2  25   GLUCOSE  117*   BUN  22   CREATININE  0.81   CALCIUM  8.7               Intake/Output       01/10/19 0700 - 19 0659 19 07 - 19 0659      [id number] 1793-8368 Total 1062-9248 7488-7834 Total       Intake    P.O.  720  200 920  --  -- --    P.O. 720 200 920 -- -- --    Total Intake 720 200 920 -- -- --       Output    Urine  550  -- 550  --  -- --    Number of Times Voided 3 x 1 x 4 x -- -- --    Urine Void (mL) 550 -- 550 -- -- --    Total Output 550 -- 550 -- -- --       Net I/O     170 200 370 -- -- --            Intake/Output Summary (Last 24 hours) at 19 0946  Last data filed at 19 0600   Gross per 24 hour   Intake              680 ml   Output                0 ml   Net              680 ml            • dexamethasone  6 mg Q6HRS   • traZODone  50 mg QHS PRN   • mag hydrox-al hydrox-simeth  30 mL Q6HRS PRN   • oxyCODONE immediate-release  5 mg Q4HRS PRN   • ALPRAZolam  0.25 mg 4X/DAY PRN   • tamsulosin  0.4 mg AFTER BREAKFAST   • acetaminophen  650 mg Q4HRS PRN   • levETIRAcetam  500 mg BID   • oxyCODONE-acetaminophen  1 Tab Q4HRS PRN   • omeprazole  20 mg DAILY   • Pharmacy Consult Request  1 Each PRN   • MD  ALERT...DO NOT ADMINISTER NSAIDS or ASPIRIN unless ORDERED By Neurosurgery  1 Each PRN   • hydrALAZINE  10 mg Q HOUR PRN   • fleet  1 Each Once PRN   • senna-docusate  2 Tab BID    And   • polyethylene glycol/lytes  1 Packet QDAY PRN    And   • magnesium hydroxide  30 mL QDAY PRN    And   • bisacodyl  10 mg QDAY PRN   • ondansetron  4 mg Q4HRS PRN   • ondansetron  4 mg Q4HRS PRN   • Pharmacy Consult Request   PRN    And   • oxyCODONE immediate-release  10 mg Q3HRS PRN    And   • HYDROmorphone  0.5 mg Q3HRS PRN   • levothyroxine  100 mcg DAILY       Assessment and Plan:  Hospital day #8 left hemiparesis  POD #4 s/p right frontal crani for tumor resection  Prophylactic anticoagulation: no         Start date/time: tbd  NM as above  Increase activity  PT/OT  CT reviewed by Dr. No- ok  Neuro checks q4  Dr. Pérez- Oncologist aware of patient status  Ok to dc home    Follow up with PCP or our office in 2 weeks for staple removal  Follow up with Oncology  Continue Keppra  Wean decadron over 2 weeks  Oncology to manage steroids thereafter

## 2019-01-11 NOTE — PROGRESS NOTES
0655 Patient's sitting up in ed. Bedside report received from NOC RN (Tammi) at the beginning of the shift. No distress noted.    0745 Patient' sitting up in bed, having Breakfast. HOB at  30 degrees. Fall Protocol in effect. Call light within reach. Reminded patient to call for assist. Assessment completed. No distress noted. Medicated with Oxycodone (see MAR) for c/o's headache, rates pain 7/10. Plan of care reviewed with the patient. Verbalized understanding.

## 2019-01-11 NOTE — PROGRESS NOTES
Bedside report recieved, accepted care. Pt is A&Ox4, reports pain, declines pain medications. Denies numbness/tingling. Incision R scalp BREANNA.     POC discussed. No further needs at this time.   Bed alarm on. Bed locked and in bed in low position, call light and belongings within reach, upper rails up. Treaded socks on. SCD's on.

## 2019-01-11 NOTE — CARE PLAN
Problem: Safety  Goal: Will remain free from injury  Outcome: PROGRESSING AS EXPECTED  Fall risk assessed using Pink-Isai Scale. Fall precautions in place. Pt calls appropriately.     Problem: Infection  Goal: Will remain free from infection    Intervention: Assess signs and symptoms of infection  VSS, Incision CDI. Hand hygiene implemented prior to and after pt care.

## 2019-01-11 NOTE — PROGRESS NOTES
1236 Patient's sitting up in bed, having lunch. Family visiting. Medicated with Oxycodone (see MAR) for c/o's headache, rates pain 7/10.

## 2019-01-11 NOTE — PROGRESS NOTES
6600 Notified Dr Lang that patient's spouse wanted to do the scheduling for  appointment per . Notified Dr Lang per MD OK to be discharge home.

## 2019-01-11 NOTE — PROGRESS NOTES
8093 Patient's in bed. Spouse visiting. No distress noted.    1050 Patient's in bed. Dr Lang visited. POC discussed with the patient & spouse.

## 2019-01-11 NOTE — THERAPY
"Occupational Therapy Evaluation completed.   Functional Status:  Pt seen for OT tx today, making steady progress with set POC, utilizing L hand compensatory strategies and grasp modifications effectively to assist with ADLs. Pt performed bed mobility with supervision, LB dressing sba including socks management, toileting sba, toilet and step in shower t/f with sba, standing oral care with sba and functional ambulation within room with sba using SPC, no lob observed, improved spontaneous BUE integration during ADLs, and improved attention to L side. Pt does demonstrates ability to d/c home with 24/7 assist from spouse with ADls/IADls as needed, would benefit from acute rehab; however pt declined at this time. Would recommend OP either hand therapist or neuro based therapy to optimize functional return of LUE. Will continue to follow while in house.   Plan of Care: Will benefit from Occupational Therapy 4 times per week  Discharge Recommendations:  Equipment: Shower Chair.     See \"Rehab Therapy-Acute\" Patient Summary Report for complete documentation.    "

## 2019-01-11 NOTE — DISCHARGE INSTRUCTIONS
Discharge Instructions    Discharged to home by car with relative. Discharged via wheelchair, hospital escort: Yes.  Special equipment needed: Cane own patient    Be sure to schedule a follow-up appointment with your primary care doctor or any specialists as instructed.     Discharge Plan:   Pneumococcal Vaccine Administered/Refused: Not given - Patient refused pneumococcal vaccine  Influenza Vaccine Indication: Patient Refuses  Influenza Vaccine Given - only chart on this line when given: Influenza Vaccine Given (See MAR)    I understand that a diet low in cholesterol, fat, and sodium is recommended for good health. Unless I have been given specific instructions below for another diet, I accept this instruction as my diet prescription.   Other diet: regular as tolerated    Special Instructions: Script for Outpatient PT given    · Is patient discharged on Warfarin / Coumadin?   No     Depression / Suicide Risk    As you are discharged from this [facility] facility, it is important to learn how to keep safe from harming yourself.    Recognize the warning signs:  · Abrupt changes in personality, positive or negative- including increase in energy   · Giving away possessions  · Change in eating patterns- significant weight changes-  positive or negative  · Change in sleeping patterns- unable to sleep or sleeping all the time   · Unwillingness or inability to communicate  · Depression  · Unusual sadness, discouragement and loneliness  · Talk of wanting to die  · Neglect of personal appearance   · Rebelliousness- reckless behavior  · Withdrawal from people/activities they love  · Confusion- inability to concentrate     If you or a loved one observes any of these behaviors or has concerns about self-harm, here's what you can do:  · Talk about it- your feelings and reasons for harming yourself  · Remove any means that you might use to hurt yourself (examples: pills, rope, extension cords, firearm)  · Get professional  "help from the community (Mental Health, Substance Abuse, psychological counseling)  · Do not be alone:Call your Safe Contact- someone whom you trust who will be there for you.  · Call your local CRISIS HOTLINE 316-0376 or 871-102-2816  · Call your local Children's Mobile Crisis Response Team [place] (265) 717-6355 or www.[place]  · Call the toll free National Suicide Prevention Hotlines   · National Suicide Prevention Lifeline 560-856-ITCJ (3147)  · [employer] Hope Line Network 800-SUICIDE (638-0603)    Brain Tumor  Brain tumors are diseases in which cancer (malignant) cells begin to grow in the tissues of the brain. Brain tumors account for 85% to 90% of all primary central nervous system (CNS) tumors. A \"primary\" brain tumor is one that starts in the brain, rather than spreading to the brain from a cancer in a different part of the body.  The brain controls memory, learning, senses (hearing, sight, smell, taste, touch), and emotion. It also controls other parts of the body, including muscles, organs, and blood vessels.   Most brain tumors occur in people age 45 or above. However, a couple rare tumors occur almost only in children.   Primary brain tumors are much less common than secondary brain tumors. A secondary brain tumor starts in a different part of the body and spreads to the brain.  CAUSES   Most primary brain tumors begin when normal cells somehow develop errors in their genetics. In most cases, it is not known how this process starts. These genetic errors allow cells to grow and divide at increased rates and to continue living when normal, healthy cells would die. The result is a clump of abnormal cells, which forms a tumor.   There are many different types of brain cells. In many cases, one type of brain cell develops the error that leads to a tumor being formed. The tumor is named according to the type of cell that developed the problem.  Environmental factors and infection, which may " cause brain cancer, include:  · Exposure to chemicals and solvents. This could happen on the job or through hobbies. Examples include:   · Vinyl chloride.   · Pesticides.   · A variety of industrial chemicals.   Research is ongoing, because it is not known whether there is a true relationship between these chemicals and the development of primary brain tumors.  · Exposure of the head to radiation, such as during a nuclear accident.   · Jane-Barr virus infection. This can cause a rare brain tumor.   · Being a transplant recipient or patient with AIDS (acquired immunodeficiency syndrome).   SYMPTOMS   Symptoms of brain tumors are related to the part of the brain that is affected. For example, a brain tumor that affects the part of the brain controlling vision will affect that sense.  General signs and symptoms include:   · Headache. Changes in pattern, higher frequency, or more severe.   · Stomach or intestinal problems, such as:   · Nausea.   · Loss of appetite.   · Vomiting.   · Changes in:   · Personality.   · Mood.   · Mental capacity.   · Concentration.   · Balance problems.   · Confusion with everyday activities.   · Speech problems.   · Seizure in someone who has never had a seizure before. These may occur in up to 20% of patients with a tumor in the upper part of the brain. Seizures may be present for months before a clear diagnosis is made.   DIAGNOSIS   The diagnosis is based on:  · Patient history.   · A nervous system function test (neurological exam).   · Diagnostic procedures, including:   · CT scan (computed tomography) and MRI scan (magnetic resonance imaging).   · After therapy, SPECT (single photon emission computed tomography) and PET (positron emission tomography) may be useful to see if there is new tumor growth. Both of these tests are ways of making images of the brain. These tests are usually only available at large hospitals.   Once a brain tumor is found, more tests may be done to  determine the type of tumor. Your caregiver will also need to know how different the tumor cells are from the cells that are near it. This is called the histologic grade of the tumor. To plan treatment, it is important to know the type and grade of brain tumor.  TREATMENT   Four kinds of direct treatment are available. The types selected depend on the type and location of the tumor.   · Surgery is the most common treatment. To take out the cancer from the brain, a surgeon will cut a part of bone from the skull to get to the brain. This procedure is called a craniotomy. After the cancer is removed, the bone will be put back in place or a piece of metal or fabric will be used to cover the opening in the skull.   · Radiation therapy and radiosurgery use X-rays to kill cancer cells from the outside and to shrink tumors. Radiation therapy may also be used by putting materials that produce radiation (radioisotopes) through thin plastic tubes, into the tumor, to kill cancer cells from the inside.   · Chemotherapy uses drugs to kill cancer cells. Chemotherapy is called a systemic treatment because the drug enters the bloodstream, travels through the body, and can kill cancer cells throughout the body. Chemotherapy may be:   · Taken by pill.   · Put into the body by a needle in a vein or muscle.   · Biological therapy uses the body's immune system to fight cancer. It is being studied in clinical trials for use in the treatment of brain cancer. It uses materials made by the body, or made in a lab, to help the body's natural defenses against disease.   Rehabilitation After Treatment   Because brain tumors can develop in parts of the brain that control motor skills, speech, vision, and thinking, rehabilitation may be a necessary part of recovery. The brain can sometimes heal itself after treatment or trauma from a brain tumor, but this can take time and patience.   · Cognitive rehabilitation can help you cope with problems of  thinking and awareness.   · Physical therapy can help you regain lost motor skills or muscle strength.   · Vocational therapy can help you get back to work after a brain tumor.   · Specialists in speech difficulties (speech pathologists) are just one of many types of therapists who can help you recover.   School-age children with brain tumors may especially benefit from tutoring as a part of their overall treatment plan. A brain tumor can cause changes in the brain that affect thinking and learning. The earlier these problems are identified, the earlier they can be addressed with strategies that provide the most benefits to the child.   SEEK MEDICAL CARE IF:   · You feel there are side effects from medicines prescribed.   · You feel that medicines for pain are not being effective.   · You develop any new symptoms that you did not have previously.   SEEK IMMEDIATE MEDICAL CARE IF:   · You develop a high fever, neck stiffness, or confusion.   · You have a headache that becomes severe or does not respond to pain medicine.   · You have a fainting episode.   · You have a seizure.   · You develop a rash.   · You develop severe vomiting or are unable to tolerate food or fluids.   Document Released: 12/19/2005 Document Revised: 03/11/2013 Document Reviewed: 11/06/2008  [employer]® Patient Information ©2013 [employer].Headaches, Frequently Asked Questions  MIGRAINE HEADACHES  Q: What is migraine? What causes it? How can I treat it?  A: Generally, migraine headaches begin as a dull ache. Then they develop into a constant, throbbing, and pulsating pain. You may experience pain at the temples. You may experience pain at the front or back of one or both sides of the head. The pain is usually accompanied by a combination of:  · Nausea.   · Vomiting.   · Sensitivity to light and noise.   Some people (about 15%) experience an aura (see below) before an attack. The cause of migraine is believed to be chemical reactions in the  "brain. Treatment for migraine may include over-the-counter or prescription medications. It may also include self-help techniques. These include relaxation training and biofeedback.   Q: What is an aura?  A: About 15% of people with migraine get an \"aura\". This is a sign of neurological symptoms that occur before a migraine headache. You may see wavy or jagged lines, dots, or flashing lights. You might experience tunnel vision or blind spots in one or both eyes. The aura can include visual or auditory hallucinations (something imagined). It may include disruptions in smell (such as strange odors), taste or touch. Other symptoms include:  · Numbness.   · A \"pins and needles\" sensation.   · Difficulty in recalling or speaking the correct word.   These neurological events may last as long as 60 minutes. These symptoms will fade as the headache begins.  Q: What is a trigger?  A: Certain physical or environmental factors can lead to or \"trigger\" a migraine. These include:  · Foods.   · Hormonal changes.   · Weather.   · Stress.   It is important to remember that triggers are different for everyone. To help prevent migraine attacks, you need to figure out which triggers affect you. Keep a headache diary. This is a good way to track triggers. The diary will help you talk to your healthcare professional about your condition.  Q: Does weather affect migraines?  A: Bright sunshine, hot, humid conditions, and drastic changes in barometric pressure may lead to, or \"trigger,\" a migraine attack in some people. But studies have shown that weather does not act as a trigger for everyone with migraines.  Q: What is the link between migraine and hormones?  A: Hormones start and regulate many of your body's functions. Hormones keep your body in balance within a constantly changing environment. The levels of hormones in your body are unbalanced at times. Examples are during menstruation, pregnancy, or menopause. That can lead to a " "migraine attack. In fact, about three quarters of all women with migraine report that their attacks are related to the menstrual cycle.   Q: Is there an increased risk of stroke for migraine sufferers?  A: The likelihood of a migraine attack causing a stroke is very remote. That is not to say that migraine sufferers cannot have a stroke associated with their migraines. In persons under age 40, the most common associated factor for stroke is migraine headache. But over the course of a person's normal life span, the occurrence of migraine headache may actually be associated with a reduced risk of dying from cerebrovascular disease due to stroke.   Q: What are acute medications for migraine?  A: Acute medications are used to treat the pain of the headache after it has started. Examples over-the-counter medications, NSAIDs, ergots, and triptans.   Q: What are the triptans?  A: Triptans are the newest class of abortive medications. They are specifically targeted to treat migraine. Triptans are vasoconstrictors. They moderate some chemical reactions in the brain. The triptans work on receptors in your brain. Triptans help to restore the balance of a neurotransmitter called serotonin. Fluctuations in levels of serotonin are thought to be a main cause of migraine.   Q: Are over-the-counter medications for migraine effective?  A: Over-the-counter, or \"OTC,\" medications may be effective in relieving mild to moderate pain and associated symptoms of migraine. But you should see your caregiver before beginning any treatment regimen for migraine.   Q: What are preventive medications for migraine?  A: Preventive medications for migraine are sometimes referred to as \"prophylactic\" treatments. They are used to reduce the frequency, severity, and length of migraine attacks. Examples of preventive medications include antiepileptic medications, antidepressants, beta-blockers, calcium channel blockers, and NSAIDs (nonsteroidal " "anti-inflammatory drugs).  Q: Why are anticonvulsants used to treat migraine?  A: During the past few years, there has been an increased interest in antiepileptic drugs for the prevention of migraine. They are sometimes referred to as \"anticonvulsants\". Both epilepsy and migraine may be caused by similar reactions in the brain.   Q: Why are antidepressants used to treat migraine?  A: Antidepressants are typically used to treat people with depression. They may reduce migraine frequency by regulating chemical levels, such as serotonin, in the brain.   Q: What alternative therapies are used to treat migraine?  A: The term \"alternative therapies\" is often used to describe treatments considered outside the scope of conventional Western medicine. Examples of alternative therapy include acupuncture, acupressure, and yoga. Another common alternative treatment is herbal therapy. Some herbs are believed to relieve headache pain. Always discuss alternative therapies with your caregiver before proceeding. Some herbal products contain arsenic and other toxins.  TENSION HEADACHES  Q: What is a tension-type headache? What causes it? How can I treat it?  A: Tension-type headaches occur randomly. They are often the result of temporary stress, anxiety, fatigue, or anger. Symptoms include soreness in your temples, a tightening band-like sensation around your head (a \"vice-like\" ache). Symptoms can also include a pulling feeling, pressure sensations, and mirian head and neck muscles. The headache begins in your forehead, temples, or the back of your head and neck. Treatment for tension-type headache may include over-the-counter or prescription medications. Treatment may also include self-help techniques such as relaxation training and biofeedback.  CLUSTER HEADACHES  Q: What is a cluster headache? What causes it? How can I treat it?  A: Cluster headache gets its name because the attacks come in groups. The pain arrives with " "little, if any, warning. It is usually on one side of the head. A tearing or bloodshot eye and a runny nose on the same side of the headache may also accompany the pain. Cluster headaches are believed to be caused by chemical reactions in the brain. They have been described as the most severe and intense of any headache type. Treatment for cluster headache includes prescription medication and oxygen.  SINUS HEADACHES  Q: What is a sinus headache? What causes it? How can I treat it?  A: When a cavity in the bones of the face and skull (a sinus) becomes inflamed, the inflammation will cause localized pain. This condition is usually the result of an allergic reaction, a tumor, or an infection. If your headache is caused by a sinus blockage, such as an infection, you will probably have a fever. An x-ray will confirm a sinus blockage. Your caregiver's treatment might include antibiotics for the infection, as well as antihistamines or decongestants.   REBOUND HEADACHES  Q: What is a rebound headache? What causes it? How can I treat it?  A: A pattern of taking acute headache medications too often can lead to a condition known as \"rebound headache.\" A pattern of taking too much headache medication includes taking it more than 2 days per week or in excessive amounts. That means more than the label or a caregiver advises. With rebound headaches, your medications not only stop relieving pain, they actually begin to cause headaches. Doctors treat rebound headache by tapering the medication that is being overused. Sometimes your caregiver will gradually substitute a different type of treatment or medication. Stopping may be a challenge. Regularly overusing a medication increases the potential for serious side effects. Consult a caregiver if you regularly use headache medications more than 2 days per week or more than the label advises.  ADDITIONAL QUESTIONS AND ANSWERS  Q: What is biofeedback?  A: Biofeedback is a self-help " treatment. Biofeedback uses special equipment to monitor your body's involuntary physical responses. Biofeedback monitors:  · Breathing.   · Pulse.   · Heart rate.   · Temperature.   · Muscle tension.   · Brain activity.   Biofeedback helps you refine and perfect your relaxation exercises. You learn to control the physical responses that are related to stress. Once the technique has been mastered, you do not need the equipment any more.  Q: Are headaches hereditary?  A: Four out of five (80%) of people that suffer report a family history of migraine. Scientists are not sure if this is genetic or a family predisposition. Despite the uncertainty, a child has a 50% chance of having migraine if one parent suffers. The child has a 75% chance if both parents suffer.   Q: Can children get headaches?  A: By the time they reach high school, most young people have experienced some type of headache. Many safe and effective approaches or medications can prevent a headache from occurring or stop it after it has begun.   Q: What type of doctor should I see to diagnose and treat my headache?  A: Start with your primary caregiver. Discuss his or her experience and approach to headaches. Discuss methods of classification, diagnosis, and treatment. Your caregiver may decide to recommend you to a headache specialist, depending upon your symptoms or other physical conditions. Having diabetes, allergies, etc., may require a more comprehensive and inclusive approach to your headache. The National Headache Foundation will provide, upon request, a list of NHF physician members in your state.  Document Released: 03/09/2005 Document Revised: 03/11/2013 Document Reviewed: 08/17/2009  [employer]® Patient Information ©2013 [employer].Tamsulosin capsules  What is this medicine?  TAMSULOSIN (lozada IZZY eugenia sin) is used to treat enlargement of the prostate gland in men, a condition called benign prostatic hyperplasia or BPH. It is not for use in  women. It works by relaxing muscles in the prostate and bladder neck. This improves urine flow and reduces BPH symptoms.  This medicine may be used for other purposes; ask your health care provider or pharmacist if you have questions.  COMMON BRAND NAME(S): Flomax  What should I tell my health care provider before I take this medicine?  They need to know if you have any of the following conditions:  -advanced kidney disease  -advanced liver disease  -low blood pressure  -prostate cancer  -an unusual or allergic reaction to tamsulosin, sulfa drugs, other medicines, foods, dyes, or preservatives  -pregnant or trying to get pregnant  -breast-feeding  How should I use this medicine?  Take this medicine by mouth about 30 minutes after the same meal every day. Follow the directions on the prescription label. Swallow the capsules whole with a glass of water. Do not crush, chew, or open capsules. Do not take your medicine more often than directed. Do not stop taking your medicine unless your doctor tells you to.  Talk to your pediatrician regarding the use of this medicine in children. Special care may be needed.  Overdosage: If you think you have taken too much of this medicine contact a poison control center or emergency room at once.  NOTE: This medicine is only for you. Do not share this medicine with others.  What if I miss a dose?  If you miss a dose, take it as soon as you can. If it is almost time for your next dose, take only that dose. Do not take double or extra doses. If you stop taking your medicine for several days or more, ask your doctor or health care professional what dose you should start back on.  What may interact with this medicine?  -cimetidine  -fluoxetine  -ketoconazole  -medicines for erectile disfunction like sildenafil, tadalafil, vardenafil  -medicines for high blood pressure  -other alpha-blockers like alfuzosin, doxazosin, phentolamine, phenoxybenzamine, prazosin, terazosin  -warfarin  This  list may not describe all possible interactions. Give your health care provider a list of all the medicines, herbs, non-prescription drugs, or dietary supplements you use. Also tell them if you smoke, drink alcohol, or use illegal drugs. Some items may interact with your medicine.  What should I watch for while using this medicine?  Visit your doctor or health care professional for regular check ups. You will need lab work done before you start this medicine and regularly while you are taking it. Check your blood pressure as directed. Ask your health care professional what your blood pressure should be, and when you should contact him or her.  This medicine may make you feel dizzy or lightheaded. This is more likely to happen after the first dose, after an increase in dose, or during hot weather or exercise. Drinking alcohol and taking some medicines can make this worse. Do not drive, use machinery, or do anything that needs mental alertness until you know how this medicine affects you. Do not sit or stand up quickly. If you begin to feel dizzy, sit down until you feel better. These effects can decrease once your body adjusts to the medicine.  Contact your doctor or health care professional right away if you have an erection that lasts longer than 4 hours or if it becomes painful. This may be a sign of a serious problem and must be treated right away to prevent permanent damage.  If you are thinking of having cataract surgery, tell your eye surgeon that you have taken this medicine.  What side effects may I notice from receiving this medicine?  Side effects that you should report to your doctor or health care professional as soon as possible:  -allergic reactions like skin rash or itching, hives, swelling of the lips, mouth, tongue, or throat  -breathing problems  -change in vision  -feeling faint or lightheaded  -irregular heartbeat  -prolonged or painful erection  -weakness  Side effects that usually do not  require medical attention (report to your doctor or health care professional if they continue or are bothersome):  -back pain  -change in sex drive or performance  -constipation, nausea or vomiting  -cough  -drowsy  -runny or stuffy nose  -trouble sleeping  This list may not describe all possible side effects. Call your doctor for medical advice about side effects. You may report side effects to FDA at 4-766-MIP-9205.  Where should I keep my medicine?  Keep out of the reach of children.  Store at room temperature between 15 and 30 degrees C (59 and 86 degrees F). Throw away any unused medicine after the expiration date.  NOTE: This sheet is a summary. It may not cover all possible information. If you have questions about this medicine, talk to your doctor, pharmacist, or health care provider.  © 2018 [employer] (2013-12-18 14:11:34)  Omeprazole tablets (OTC)  What is this medicine?  OMEPRAZOLE (oh ME pray zol) prevents the production of acid in the stomach. It is used to treat the symptoms of heartburn. You can buy this medicine without a prescription. This product is not for long-term use, unless otherwise directed by your doctor or health care professional.  This medicine may be used for other purposes; ask your health care provider or pharmacist if you have questions.  COMMON BRAND NAME(S): Prilosec OTC  What should I tell my health care provider before I take this medicine?  They need to know if you have any of these conditions:  -black or bloody stools  -chest pain  -difficulty swallowing  -have had heartburn for over 3 months  -have heartburn with dizziness, lightheadedness or sweating  -liver disease  -lupus  -stomach pain  -unexplained weight loss  -vomiting with blood  -wheezing  -an unusual or allergic reaction to omeprazole, other medicines, foods, dyes, or preservatives  -pregnant or trying to get pregnant  -breast-feeding  How should I use this medicine?  Take this medicine by mouth. Follow  the directions on the product label. If you are taking this medicine without a prescription, take one tablet every day. Do not use for longer than 14 days or repeat a course of treatment more often than every 4 months unless directed by a doctor or healthcare professional. Take your dose at regular intervals every 24 hours. Swallow the tablet whole with a drink of water. Do not crush, break or chew. This medicine works best if taken on an empty stomach 30 minutes before breakfast. If you are using this medicine with the prescription of your doctor or healthcare professional, follow the directions you were given. Do not take your medicine more often than directed.  Talk to your pediatrician regarding the use of this medicine in children. Special care may be needed.  Overdosage: If you think you have taken too much of this medicine contact a poison control center or emergency room at once.  NOTE: This medicine is only for you. Do not share this medicine with others.  What if I miss a dose?  If you miss a dose, take it as soon as you can. If it is almost time for your next dose, take only that dose. Do not take double or extra doses.  What may interact with this medicine?  Do not take this medicine with any of the following medications:  -atazanavir  -clopidogrel  -nelfinavir  This medicine may also interact with the following medications:  -ampicillin  -certain medicines for anxiety or sleep  -certain medicines that treat or prevent blood clots like warfarin  -cyclosporine  -diazepam  -digoxin  -disulfiram  -iron salts  -methotrexate  -mycophenolate mofetil  -phenytoin  -prescription medicine for fungal or yeast infection like itraconazole, ketoconazole, voriconazole  -saquinavir  -tacrolimus  This list may not describe all possible interactions. Give your health care provider a list of all the medicines, herbs, non-prescription drugs, or dietary supplements you use. Also tell them if you smoke, drink alcohol, or use  illegal drugs. Some items may interact with your medicine.  What should I watch for while using this medicine?  It can take several days before your heartburn gets better. Check with your doctor or health care professional if your condition does not start to get better, or if it gets worse.  Do not treat diarrhea with over the counter products. Contact your doctor if you have diarrhea that lasts more than 2 days or if it is severe and watery.  Do not treat yourself for heartburn with this medicine for more than 14 days in a row. You should only use this medicine for a 2-week treatment period once every 4 months. If your symptoms return shortly after your therapy is complete, or within the 4 month time frame, call your doctor or health care professional.  What side effects may I notice from receiving this medicine?  Side effects that you should report to your doctor or health care professional as soon as possible:  -allergic reactions like skin rash, itching or hives, swelling of the face, lips, or tongue  -bone, muscle or joint pain  -breathing problems  -chest pain or chest tightness  -dark yellow or brown urine  -diarrhea  -dizziness  -fast, irregular heartbeat  -feeling faint or lightheaded  -fever or sore throat  -muscle spasm  -palpitations  -rash on cheeks or arms that gets worse in the sun  -redness, blistering, peeling or loosening of the skin, including inside the mouth  -seizures  -tremors  -unusual bleeding or bruising  -unusually weak or tired  -yellowing of the eyes or skin  Side effects that usually do not require medical attention (report to your doctor or health care professional if they continue or are bothersome):  -constipation  -dry mouth  -headache  -loose stools  -nausea  This list may not describe all possible side effects. Call your doctor for medical advice about side effects. You may report side effects to FDA at 2-112-FDA-9844.  Where should I keep my medicine?  Keep out of the reach of  children.  Store at room temperature between 20 and 25 degrees C (68 and 77 degrees F). Protect from light and moisture. Throw away any unused medicine after the expiration date.  NOTE: This sheet is a summary. It may not cover all possible information. If you have questions about this medicine, talk to your doctor, pharmacist, or health care provider.  © 2018 [employer] Standard (2017-01-19 13:06:31)  Levetiracetam tablets  What is this medicine?  LEVETIRACETAM (angelique falguni sarahycass DORADO se lozada) is an antiepileptic drug. It is used with other medicines to treat certain types of seizures.  This medicine may be used for other purposes; ask your health care provider or pharmacist if you have questions.  COMMON BRAND NAME(S): Ishan Lara  What should I tell my health care provider before I take this medicine?  They need to know if you have any of these conditions:  -kidney disease  -suicidal thoughts, plans, or attempt; a previous suicide attempt by you or a family member  -an unusual or allergic reaction to levetiracetam, other medicines, foods, dyes, or preservatives  -pregnant or trying to get pregnant  -breast-feeding  How should I use this medicine?  Take this medicine by mouth with a glass of water. Follow the directions on the prescription label. Swallow the tablets whole. Do not crush or chew this medicine. You may take this medicine with or without food. Take your doses at regular intervals. Do not take your medicine more often than directed. Do not stop taking this medicine or any of your seizure medicines unless instructed by your doctor or health care professional. Stopping your medicine suddenly can increase your seizures or their severity.  A special MedGuide will be given to you by the pharmacist with each prescription and refill. Be sure to read this information carefully each time.  Contact your pediatrician or health care professional regarding the use of this medication in children. While this drug may  be prescribed for children as young as 4 years of age for selected conditions, precautions do apply.  Overdosage: If you think you have taken too much of this medicine contact a poison control center or emergency room at once.  NOTE: This medicine is only for you. Do not share this medicine with others.  What if I miss a dose?  If you miss a dose, take it as soon as you can. If it is almost time for your next dose, take only that dose. Do not take double or extra doses.  What may interact with this medicine?  This medicine may interact with the following medications:  -carbamazepine  -colesevelam  -probenecid  -sevelamer  This list may not describe all possible interactions. Give your health care provider a list of all the medicines, herbs, non-prescription drugs, or dietary supplements you use. Also tell them if you smoke, drink alcohol, or use illegal drugs. Some items may interact with your medicine.  What should I watch for while using this medicine?  Visit your doctor or health care professional for a regular check on your progress. Wear a medical identification bracelet or chain to say you have epilepsy, and carry a card that lists all your medications.  It is important to take this medicine exactly as instructed by your health care professional. When first starting treatment, your dose may need to be adjusted. It may take weeks or months before your dose is stable. You should contact your doctor or health care professional if your seizures get worse or if you have any new types of seizures.  You may get drowsy or dizzy. Do not drive, use machinery, or do anything that needs mental alertness until you know how this medicine affects you. Do not stand or sit up quickly, especially if you are an older patient. This reduces the risk of dizzy or fainting spells. Alcohol may interfere with the effect of this medicine. Avoid alcoholic drinks.  The use of this medicine may increase the chance of suicidal thoughts or  actions. Pay special attention to how you are responding while on this medicine. Any worsening of mood, or thoughts of suicide or dying should be reported to your health care professional right away.  Women who become pregnant while using this medicine may enroll in the [place] Antiepileptic Drug Pregnancy Registry by calling 1-317.425.4860. This registry collects information about the safety of antiepileptic drug use during pregnancy.  What side effects may I notice from receiving this medicine?  Side effects that you should report to your doctor or health care professional as soon as possible:  -allergic reactions like skin rash, itching or hives, swelling of the face, lips, or tongue  -breathing problems  -dark urine  -general ill feeling or flu-like symptoms  -problems with balance, talking, walking  -unusually weak or tired  -worsening of mood, thoughts or actions of suicide or dying  -yellowing of the eyes or skin  Side effects that usually do not require medical attention (report to your doctor or health care professional if they continue or are bothersome):  -diarrhea  -dizzy, drowsy  -headache  -loss of appetite  This list may not describe all possible side effects. Call your doctor for medical advice about side effects. You may report side effects to FDA at 0-837-FDA-3761.  Where should I keep my medicine?  Keep out of reach of children.  Store at room temperature between 15 and 30 degrees C (59 and 86 degrees F). Throw away any unused medicine after the expiration date.  NOTE: This sheet is a summary. It may not cover all possible information. If you have questions about this medicine, talk to your doctor, pharmacist, or health care provider.  © 2018 [employer] Standard (2017-01-19 09:43:54)  Dexamethasone tablets  What is this medicine?  DEXAMETHASONE (dex a METH a sone) is a corticosteroid. It is commonly used to treat inflammation of the skin, joints, lungs, and other organs. Common  conditions treated include asthma, allergies, and arthritis. It is also used for other conditions, such as blood disorders and diseases of the adrenal glands.  This medicine may be used for other purposes; ask your health care provider or pharmacist if you have questions.  COMMON BRAND NAME(S): CUSHINGS SYNDROME DIAGNOSTIC, Decadron, DexPak Jr TaperPak, DexPak TaperPak, Zema-Brian, ZonaCort 11 Day, ZonaCort 7 Day  What should I tell my health care provider before I take this medicine?  They need to know if you have any of these conditions:  -Cushing's syndrome  -diabetes  -glaucoma  -heart problems or disease  -high blood pressure  -infection like herpes, measles, tuberculosis, or chickenpox  -kidney disease  -liver disease  -mental problems  -myasthenia gravis  -osteoporosis  -previous heart attack  -seizures  -stomach, ulcer or intestine disease including colitis and diverticulitis  -thyroid problem  -an unusual or allergic reaction to dexamethasone, corticosteroids, other medicines, lactose, foods, dyes, or preservatives  -pregnant or trying to get pregnant  -breast-feeding  How should I use this medicine?  Take this medicine by mouth with a drink of water. Follow the directions on the prescription label. Take it with food or milk to avoid stomach upset. If you are taking this medicine once a day, take it in the morning. Do not take more medicine than you are told to take. Do not suddenly stop taking your medicine because you may develop a severe reaction. Your doctor will tell you how much medicine to take. If your doctor wants you to stop the medicine, the dose may be slowly lowered over time to avoid any side effects.  Talk to your pediatrician regarding the use of this medicine in children. Special care may be needed.  Patients over 65 years old may have a stronger reaction and need a smaller dose.  Overdosage: If you think you have taken too much of this medicine contact a poison control center or emergency  room at once.  NOTE: This medicine is only for you. Do not share this medicine with others.  What if I miss a dose?  If you miss a dose, take it as soon as you can. If it is almost time for your next dose, talk to your doctor or health care professional. You may need to miss a dose or take an extra dose. Do not take double or extra doses without advice.  What may interact with this medicine?  Do not take this medicine with any of the following medications:  -mifepristone, RU-486  -vaccines  This medicine may also interact with the following medications:  -amphotericin B  -antibiotics like clarithromycin, erythromycin, and troleandomycin  -aspirin and aspirin-like drugs  -barbiturates like phenobarbital  -carbamazepine  -cholestyramine  -cholinesterase inhibitors like donepezil, galantamine, rivastigmine, and tacrine  -cyclosporine  -digoxin  -diuretics  -ephedrine  -female hormones, like estrogens or progestins and birth control pills  -indinavir  -isoniazid  -ketoconazole  -medicines for diabetes  -medicines that improve muscle tone or strength for conditions like myasthenia gravis  -NSAIDs, medicines for pain and inflammation, like ibuprofen or naproxen  -phenytoin  -rifampin  -thalidomide  -warfarin  This list may not describe all possible interactions. Give your health care provider a list of all the medicines, herbs, non-prescription drugs, or dietary supplements you use. Also tell them if you smoke, drink alcohol, or use illegal drugs. Some items may interact with your medicine.  What should I watch for while using this medicine?  Visit your doctor or health care professional for regular checks on your progress. If you are taking this medicine over a prolonged period, carry an identification card with your name and address, the type and dose of your medicine, and your doctor's name and address.  This medicine may increase your risk of getting an infection. Stay away from people who are sick. Tell your doctor  or health care professional if you are around anyone with measles or chickenpox.  If you are going to have surgery, tell your doctor or health care professional that you have taken this medicine within the last twelve months.  Ask your doctor or health care professional about your diet. You may need to lower the amount of salt you eat.  The medicine can increase your blood sugar. If you are a diabetic check with your doctor if you need help adjusting the dose of your diabetic medicine.  What side effects may I notice from receiving this medicine?  Side effects that you should report to your doctor or health care professional as soon as possible:  -allergic reactions like skin rash, itching or hives, swelling of the face, lips, or tongue  -changes in vision  -fever, sore throat, sneezing, cough, or other signs of infection, wounds that will not heal  -increased thirst  -mental depression, mood swings, mistaken feelings of self importance or of being mistreated  -pain in hips, back, ribs, arms, shoulders, or legs  -redness, blistering, peeling or loosening of the skin, including inside the mouth  -trouble passing urine or change in the amount of urine  -swelling of feet or lower legs  -unusual bleeding or bruising  Side effects that usually do not require medical attention (report to your doctor or health care professional if they continue or are bothersome):  -headache  -nausea, vomiting  -skin problems, acne, thin and shiny skin  -weight gain  This list may not describe all possible side effects. Call your doctor for medical advice about side effects. You may report side effects to FDA at 0-729-FDA-4183.  Where should I keep my medicine?  Keep out of the reach of children.  Store at room temperature between 20 and 25 degrees C (68 and 77 degrees F). Protect from light. Throw away any unused medicine after the expiration date.  NOTE: This sheet is a summary. It may not cover all possible information. If you have  questions about this medicine, talk to your doctor, pharmacist, or health care provider.  © 2018 [employer] (2009-04-09 14:02:13)

## 2019-01-14 ENCOUNTER — PATIENT OUTREACH (OUTPATIENT)
Dept: OTHER | Facility: MEDICAL CENTER | Age: 69
End: 2019-01-14

## 2019-01-14 NOTE — PROGRESS NOTES
Reviewed chart from recent pathology positive for metastaic Lung cancer. The patient has establied care with Dr. Pérez in [place].  Nurse Navigation will sign off.

## 2019-02-20 ENCOUNTER — HOSPITAL ENCOUNTER (OUTPATIENT)
Dept: HOSPITAL 8 - CFH | Age: 69
Discharge: HOME | End: 2019-02-20
Attending: RADIOLOGY
Payer: MEDICARE

## 2019-02-20 ENCOUNTER — HOSPITAL ENCOUNTER (OUTPATIENT)
Dept: HOSPITAL 8 - ROC | Age: 69
Discharge: HOME | End: 2019-02-20
Attending: RADIOLOGY
Payer: MEDICARE
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
## 2019-02-20 DIAGNOSIS — C79.31: ICD-10-CM

## 2019-02-20 DIAGNOSIS — E03.9: ICD-10-CM

## 2019-02-20 DIAGNOSIS — T50.8X4A: Primary | ICD-10-CM

## 2019-02-20 DIAGNOSIS — C34.12: Primary | ICD-10-CM

## 2019-02-20 LAB — CREAT SERPL-MCNC: 0.99 MG/DL (ref 0.7–1.3)

## 2019-02-20 PROCEDURE — 77290 THER RAD SIMULAJ FIELD CPLX: CPT

## 2019-02-20 PROCEDURE — 77470 SPECIAL RADIATION TREATMENT: CPT

## 2019-02-20 PROCEDURE — 77334 RADIATION TREATMENT AID(S): CPT

## 2019-02-20 PROCEDURE — 84520 ASSAY OF UREA NITROGEN: CPT

## 2019-02-20 PROCEDURE — 82565 ASSAY OF CREATININE: CPT

## 2019-02-20 PROCEDURE — 36415 COLL VENOUS BLD VENIPUNCTURE: CPT

## 2019-02-22 ENCOUNTER — HOSPITAL ENCOUNTER (OUTPATIENT)
Dept: HOSPITAL 8 - CFH | Age: 69
Discharge: HOME | End: 2019-02-22
Attending: FAMILY MEDICINE
Payer: MEDICARE

## 2019-02-22 DIAGNOSIS — G31.9: ICD-10-CM

## 2019-02-22 DIAGNOSIS — C79.31: Primary | ICD-10-CM

## 2019-02-22 DIAGNOSIS — C34.90: ICD-10-CM

## 2019-02-22 DIAGNOSIS — Z98.890: ICD-10-CM

## 2019-02-22 PROCEDURE — A9585 GADOBUTROL INJECTION: HCPCS

## 2019-02-22 PROCEDURE — 70553 MRI BRAIN STEM W/O & W/DYE: CPT

## 2019-04-18 ENCOUNTER — HOSPITAL ENCOUNTER (OUTPATIENT)
Dept: HOSPITAL 8 - ROC | Age: 69
Discharge: HOME | End: 2019-04-18
Attending: RADIOLOGY
Payer: MEDICARE

## 2019-04-18 DIAGNOSIS — C79.31: Primary | ICD-10-CM

## 2020-11-11 ENCOUNTER — HOSPITAL ENCOUNTER (OUTPATIENT)
Dept: RADIOLOGY | Facility: MEDICAL CENTER | Age: 70
End: 2020-11-11
Attending: NEUROLOGICAL SURGERY
Payer: MEDICARE

## 2020-11-11 DIAGNOSIS — D49.6 NEOPLASM OF BRAIN IN ADULT (HCC): ICD-10-CM

## 2020-11-11 PROCEDURE — 76390 MR SPECTROSCOPY: CPT

## 2021-01-19 PROBLEM — E53.8 B12 DEFICIENCY: Status: ACTIVE | Noted: 2021-01-19

## 2021-01-19 PROBLEM — R53.83 FATIGUE: Status: ACTIVE | Noted: 2021-01-19

## 2021-01-19 PROBLEM — R51.9 CHRONIC INTRACTABLE HEADACHE: Status: ACTIVE | Noted: 2021-01-19

## 2021-01-19 PROBLEM — G89.29 CHRONIC INTRACTABLE HEADACHE: Status: ACTIVE | Noted: 2021-01-19

## 2021-01-19 PROBLEM — R53.1 GENERALIZED WEAKNESS: Status: ACTIVE | Noted: 2021-01-19

## 2021-04-14 PROBLEM — G89.29 OTHER CHRONIC PAIN: Status: ACTIVE | Noted: 2021-04-14

## 2021-09-14 PROBLEM — R03.0 ELEVATED BLOOD PRESSURE READING IN OFFICE WITHOUT DIAGNOSIS OF HYPERTENSION: Status: ACTIVE | Noted: 2021-09-14

## 2021-10-12 PROBLEM — G89.3 CHRONIC PAIN DUE TO NEOPLASM: Status: ACTIVE | Noted: 2021-10-12

## 2021-10-12 PROBLEM — G89.29 OTHER CHRONIC PAIN: Status: RESOLVED | Noted: 2021-04-14 | Resolved: 2021-10-12

## 2021-11-23 PROBLEM — M54.2 CHRONIC NECK PAIN: Status: ACTIVE | Noted: 2021-04-14

## 2021-11-23 PROBLEM — M54.41 CHRONIC RIGHT-SIDED LOW BACK PAIN WITH RIGHT-SIDED SCIATICA: Status: ACTIVE | Noted: 2021-11-23

## 2021-11-23 PROBLEM — G89.29 CHRONIC RIGHT-SIDED LOW BACK PAIN WITH RIGHT-SIDED SCIATICA: Status: ACTIVE | Noted: 2021-11-23

## 2022-07-20 PROBLEM — D63.8 ANEMIA OF CHRONIC ILLNESS: Status: RESOLVED | Noted: 2019-01-04 | Resolved: 2022-07-20

## 2022-07-20 PROBLEM — L98.9 SKIN LESION: Status: ACTIVE | Noted: 2022-07-20

## 2022-07-20 PROBLEM — R53.1 GENERALIZED WEAKNESS: Status: RESOLVED | Noted: 2021-01-19 | Resolved: 2022-07-20

## 2022-07-20 PROBLEM — R53.83 FATIGUE: Status: RESOLVED | Noted: 2021-01-19 | Resolved: 2022-07-20

## 2022-07-20 PROBLEM — M25.552 LEFT HIP PAIN: Status: ACTIVE | Noted: 2022-07-20

## 2022-07-20 PROBLEM — R53.1 LEFT-SIDED WEAKNESS: Status: RESOLVED | Noted: 2019-01-04 | Resolved: 2022-07-20

## 2022-08-23 PROBLEM — Z96.642 STATUS POST TOTAL HIP REPLACEMENT, LEFT: Status: ACTIVE | Noted: 2022-08-23

## 2023-09-12 PROBLEM — L40.50 PSORIATIC ARTHRITIS (HCC): Status: ACTIVE | Noted: 2023-09-12

## (undated) DEVICE — SET LEADWIRE 5 LEAD BEDSIDE DISPOSABLE ECG (1SET OF 5/EA)

## (undated) DEVICE — CATHETER COUDE FOLEY 5CC - 14FR (12EA/CA)

## (undated) DEVICE — CANISTER SUCTION 3000ML MECHANICAL FILTER AUTO SHUTOFF MEDI-VAC NONSTERILE LF DISP  (40EA/CA)

## (undated) DEVICE — CATHETER FOLEY 30CC 18 FR - 2-WAY-100% SILICONE

## (undated) DEVICE — PATTIES SURG X-RAYCOTTONOID - 1/2 X 3 IN (200/CA)

## (undated) DEVICE — SUTURE 3-0 VICRYL PLUS RB-1 - 8 X 18 INCH (12/BX)

## (undated) DEVICE — SLEEVE, VASO, THIGH, MED

## (undated) DEVICE — LACTATED RINGERS INJ. 500 ML - (24EA/CA)

## (undated) DEVICE — NEEDLE NON SAFETY HYPO 22 GA X 1 1/2 IN (100/BX)

## (undated) DEVICE — ELECTRODE 850 FOAM ADHESIVE - HYDROGEL RADIOTRNSPRNT (50/PK)

## (undated) DEVICE — BANDAGE ROLL STERILE BULKEE 4.5 IN X 4 YD (100EA/CA)

## (undated) DEVICE — SPHERE NAVIGATION STEALTH (5EA/TY 12TY/PK)

## (undated) DEVICE — GLOVE BIOGEL SZ 6.5 SURGICAL PF LTX (50PR/BX 4BX/CA)

## (undated) DEVICE — PACK CRANI - (1EA/CA)

## (undated) DEVICE — DRESSING TRANSPARENT FILM TEGADERM 4 X 4.75" (50EA/BX)"

## (undated) DEVICE — SET EXTENSION WITH 2 PORTS (48EA/CA) ***PART #2C8610 IS A SUBSTITUTE*****

## (undated) DEVICE — DRAPE STRLE REG TOWEL 18X24 - (10/BX 4BX/CA)"

## (undated) DEVICE — GOWN WARMING STANDARD FLEX - (30/CA)

## (undated) DEVICE — SYRINGE SAFETY TB 1 ML 27 GA X 1/2 IN (100/BX 4BX/CA)

## (undated) DEVICE — SUTURE 2-0 VICRYL PLUS CT-1 - 8 X 18 INCH(12/BX)

## (undated) DEVICE — SPONGE GAUZESTER 4 X 4 4PLY - (128PK/CA)

## (undated) DEVICE — LACTATED RINGERS INJ 1000 ML - (14EA/CA 60CA/PF)

## (undated) DEVICE — GLOVE BIOGEL PI INDICATOR SZ 7.5 SURGICAL PF LF -(50/BX 4BX/CA)

## (undated) DEVICE — MIDAS LUBRICATOR DIFFUSER PACK (4EA/CA)

## (undated) DEVICE — TUBING C&T SET FLYING LEADS DRAIN TUBING (10EA/BX)

## (undated) DEVICE — SUTURE 4-0 NUROLON CR/8 TF - (12/BX) ETHICON

## (undated) DEVICE — GLOVE BIOGEL INDICATOR SZ 7SURGICAL PF LTX - (50/BX 4BX/CA)

## (undated) DEVICE — COVER PROBE STERILE CONE (12EA/CA)

## (undated) DEVICE — SUTURE GENERAL

## (undated) DEVICE — KIT SURGIFLO W/OUT THROMBIN - (6EA/CA)

## (undated) DEVICE — TUBING CLEARLINK DUO-VENT - C-FLO (48EA/CA)

## (undated) DEVICE — GLOVE SZ 7 BIOGEL PI MICRO - PF LF (50PR/BX 4BX/CA)

## (undated) DEVICE — GLOVE BIOGEL PI ORTHO SZ 6 1/2 SURGICAL PF LF (40PR/BX)

## (undated) DEVICE — KIT ROOM DECONTAMINATION

## (undated) DEVICE — GOWN SURGEONS X-LARGE - DISP. (30/CA)

## (undated) DEVICE — DRAPE MEDI-SLUSH STERILE  - (40/CA)

## (undated) DEVICE — GLOVE SZ 6.5 BIOGEL PI MICRO - PF LF (50PR/BX)

## (undated) DEVICE — SURGIFOAM (SIZE 100) - (6EA/CA)

## (undated) DEVICE — GLOVE BIOGEL PI INDICATOR SZ 7.0 SURGICAL PF LF - (50/BX 4BX/CA)

## (undated) DEVICE — SENSOR SPO2 NEO LNCS ADHESIVE (20/BX) SEE USER NOTES

## (undated) DEVICE — PERFORATER DISP TIP DGR-0

## (undated) DEVICE — PROTECTOR ULNA NERVE - (36PR/CA)

## (undated) DEVICE — SCALP CLIP RANEY 20-1037 (10EA/PK 20PK/CA)

## (undated) DEVICE — GLOVE BIOGEL SZ 8 SURGICAL PF LTX - (50PR/BX 4BX/CA)

## (undated) DEVICE — HEMOSTAT SURG ABSORBABLE - 4 X 8 IN SURGICEL (24EA/CA)

## (undated) DEVICE — KIT RADIAL ARTERY 20GA W/MAX BARRIER AND BIOPATCH  (5EA/CA) #10740 IS FOR THE SET RADIAL ARTERIAL

## (undated) DEVICE — SODIUM CHL IRRIGATION 0.9% 1000ML (12EA/CA)

## (undated) DEVICE — ELECTRODE DUAL RETURN W/ CORD - (50/PK)

## (undated) DEVICE — TRAY SRGPRP PVP IOD WT PRP - (20/CA)

## (undated) DEVICE — KIT EVACUATER 3 SPRING PVC LF 1/8 DRAIN SIZE (10EA/CA)"

## (undated) DEVICE — SUCTION INSTRUMENT YANKAUER BULBOUS TIP W/O VENT (50EA/CA)

## (undated) DEVICE — GLOVE BIOGEL PI INDICATOR SZ 8.5 SURGICAL PF LF - (50PR/BX 4BX/CA)

## (undated) DEVICE — BLADE CLIPPER FITS 2501 CLIPPER (BLUE)  (20EA/CA)

## (undated) DEVICE — KIT ANESTHESIA W/CIRCUIT & 3/LT BAG W/FILTER (20EA/CA)

## (undated) DEVICE — PATTIES SURG X-RAYCOTTONOID - 1 X 3 IN (200/CA)

## (undated) DEVICE — NEPTUNE 4 PORT MANIFOLD - (20/PK)

## (undated) DEVICE — TRAY CATHETER FOLEY URINE METER W/STATLOCK 350ML (10EA/CA)

## (undated) DEVICE — MASK ANESTHESIA ADULT  - (100/CA)

## (undated) DEVICE — GLOVE BIOGEL SZ 8.5 SURGICAL PF LTX - (50PR/BX 4BX/CA)